# Patient Record
Sex: MALE | Race: WHITE | NOT HISPANIC OR LATINO
[De-identification: names, ages, dates, MRNs, and addresses within clinical notes are randomized per-mention and may not be internally consistent; named-entity substitution may affect disease eponyms.]

---

## 2017-02-16 ENCOUNTER — NON-APPOINTMENT (OUTPATIENT)
Age: 47
End: 2017-02-16

## 2017-02-16 ENCOUNTER — APPOINTMENT (OUTPATIENT)
Dept: ELECTROPHYSIOLOGY | Facility: CLINIC | Age: 47
End: 2017-02-16

## 2017-02-16 VITALS
HEIGHT: 77 IN | WEIGHT: 260 LBS | SYSTOLIC BLOOD PRESSURE: 119 MMHG | OXYGEN SATURATION: 98 % | BODY MASS INDEX: 30.7 KG/M2 | DIASTOLIC BLOOD PRESSURE: 80 MMHG | HEART RATE: 90 BPM

## 2017-02-16 DIAGNOSIS — I49.9 CARDIAC ARRHYTHMIA, UNSPECIFIED: ICD-10-CM

## 2017-02-16 DIAGNOSIS — F15.90 OTHER STIMULANT USE, UNSPECIFIED, UNCOMPLICATED: ICD-10-CM

## 2017-04-17 ENCOUNTER — OTHER (OUTPATIENT)
Age: 47
End: 2017-04-17

## 2017-04-20 ENCOUNTER — APPOINTMENT (OUTPATIENT)
Dept: CV DIAGNOSITCS | Facility: HOSPITAL | Age: 47
End: 2017-04-20

## 2017-04-20 ENCOUNTER — OUTPATIENT (OUTPATIENT)
Dept: OUTPATIENT SERVICES | Facility: HOSPITAL | Age: 47
LOS: 1 days | End: 2017-04-20
Payer: COMMERCIAL

## 2017-04-20 VITALS
WEIGHT: 270.07 LBS | OXYGEN SATURATION: 98 % | RESPIRATION RATE: 16 BRPM | HEART RATE: 75 BPM | HEIGHT: 77 IN | DIASTOLIC BLOOD PRESSURE: 78 MMHG | TEMPERATURE: 98 F | SYSTOLIC BLOOD PRESSURE: 124 MMHG

## 2017-04-20 DIAGNOSIS — I49.9 CARDIAC ARRHYTHMIA, UNSPECIFIED: ICD-10-CM

## 2017-04-20 DIAGNOSIS — Z01.818 ENCOUNTER FOR OTHER PREPROCEDURAL EXAMINATION: ICD-10-CM

## 2017-04-20 LAB
ALBUMIN SERPL ELPH-MCNC: 3.9 G/DL — SIGNIFICANT CHANGE UP (ref 3.3–5)
ALP SERPL-CCNC: 32 U/L — LOW (ref 40–120)
ALT FLD-CCNC: 29 U/L RC — SIGNIFICANT CHANGE UP (ref 10–45)
ANION GAP SERPL CALC-SCNC: 11 MMOL/L — SIGNIFICANT CHANGE UP (ref 5–17)
APTT BLD: 44.7 SEC — HIGH (ref 27.5–37.4)
AST SERPL-CCNC: 28 U/L — SIGNIFICANT CHANGE UP (ref 10–40)
BILIRUB SERPL-MCNC: 0.5 MG/DL — SIGNIFICANT CHANGE UP (ref 0.2–1.2)
BUN SERPL-MCNC: 15 MG/DL — SIGNIFICANT CHANGE UP (ref 7–23)
CALCIUM SERPL-MCNC: 9.5 MG/DL — SIGNIFICANT CHANGE UP (ref 8.4–10.5)
CHLORIDE SERPL-SCNC: 104 MMOL/L — SIGNIFICANT CHANGE UP (ref 96–108)
CO2 SERPL-SCNC: 27 MMOL/L — SIGNIFICANT CHANGE UP (ref 22–31)
CREAT SERPL-MCNC: 1.15 MG/DL — SIGNIFICANT CHANGE UP (ref 0.5–1.3)
GLUCOSE SERPL-MCNC: 104 MG/DL — HIGH (ref 70–99)
HCT VFR BLD CALC: 51.1 % — HIGH (ref 39–50)
HGB BLD-MCNC: 17.4 G/DL — HIGH (ref 13–17)
INR BLD: 2.37 RATIO — HIGH (ref 0.88–1.16)
MCHC RBC-ENTMCNC: 32.7 PG — SIGNIFICANT CHANGE UP (ref 27–34)
MCHC RBC-ENTMCNC: 34.1 GM/DL — SIGNIFICANT CHANGE UP (ref 32–36)
MCV RBC AUTO: 96.1 FL — SIGNIFICANT CHANGE UP (ref 80–100)
PLATELET # BLD AUTO: 175 K/UL — SIGNIFICANT CHANGE UP (ref 150–400)
POTASSIUM SERPL-MCNC: 5.4 MMOL/L — HIGH (ref 3.5–5.3)
POTASSIUM SERPL-SCNC: 5.4 MMOL/L — HIGH (ref 3.5–5.3)
PROT SERPL-MCNC: 6.5 G/DL — SIGNIFICANT CHANGE UP (ref 6–8.3)
PROTHROM AB SERPL-ACNC: 26.3 SEC — HIGH (ref 9.8–12.7)
RBC # BLD: 5.32 M/UL — SIGNIFICANT CHANGE UP (ref 4.2–5.8)
RBC # FLD: 13.5 % — SIGNIFICANT CHANGE UP (ref 10.3–14.5)
SODIUM SERPL-SCNC: 142 MMOL/L — SIGNIFICANT CHANGE UP (ref 135–145)
WBC # BLD: 7.5 K/UL — SIGNIFICANT CHANGE UP (ref 3.8–10.5)
WBC # FLD AUTO: 7.5 K/UL — SIGNIFICANT CHANGE UP (ref 3.8–10.5)

## 2017-04-20 PROCEDURE — 85610 PROTHROMBIN TIME: CPT

## 2017-04-20 PROCEDURE — 93312 ECHO TRANSESOPHAGEAL: CPT

## 2017-04-20 PROCEDURE — 85730 THROMBOPLASTIN TIME PARTIAL: CPT

## 2017-04-20 PROCEDURE — 85027 COMPLETE CBC AUTOMATED: CPT

## 2017-04-20 PROCEDURE — 93306 TTE W/DOPPLER COMPLETE: CPT | Mod: 26

## 2017-04-20 PROCEDURE — 93010 ELECTROCARDIOGRAM REPORT: CPT

## 2017-04-20 PROCEDURE — 93306 TTE W/DOPPLER COMPLETE: CPT

## 2017-04-20 PROCEDURE — 93312 ECHO TRANSESOPHAGEAL: CPT | Mod: 26

## 2017-04-20 PROCEDURE — 80053 COMPREHEN METABOLIC PANEL: CPT

## 2017-04-20 RX ORDER — METOPROLOL TARTRATE 50 MG
1 TABLET ORAL
Qty: 0 | Refills: 0 | COMMUNITY

## 2017-04-20 RX ORDER — AMIODARONE HYDROCHLORIDE 400 MG/1
2 TABLET ORAL
Qty: 0 | Refills: 0 | COMMUNITY

## 2017-04-20 NOTE — H&P CARDIOLOGY - PSH
Gastric bypass status for obesity    H/O nasal septoplasty Gastric bypass status for obesity    H/O nasal septoplasty    S/P AVR

## 2017-04-20 NOTE — H&P CARDIOLOGY - HISTORY OF PRESENT ILLNESS
This is a 46 yr old male, former smoker, with PMH of HTN, Aortic Aneurysm, Atrial flutter dx in 2013 ( on Coumadin last dose 4/20). Presents to Md Burger with occasional dizziness, and palpitations worse in the past 2 months.  Denies chest pain, syncope, dyspnea, orthopnea, N&V, HA. Presents here today for PST for EP study w/ possible atrial flutter ablation tomorrow 4/21. Currently asymptomatic. This is a 46 yr old male, former smoker, with PMH of HTN, Aortic Aneurysm, Atrial flutter dx in 2013 and AVR ( on Coumadin last dose 4/20). Presents to Md Burger with occasional dizziness, and palpitations worse in the past 2 months.  Denies chest pain, syncope, dyspnea, orthopnea, N&V, HA. Presents here today for PST for EP study w/ possible atrial flutter ablation tomorrow 4/21. Currently asymptomatic.

## 2017-04-21 ENCOUNTER — OUTPATIENT (OUTPATIENT)
Dept: INPATIENT UNIT | Facility: HOSPITAL | Age: 47
LOS: 1 days | End: 2017-04-21
Payer: COMMERCIAL

## 2017-04-21 ENCOUNTER — TRANSCRIPTION ENCOUNTER (OUTPATIENT)
Age: 47
End: 2017-04-21

## 2017-04-21 VITALS
HEART RATE: 80 BPM | WEIGHT: 270.07 LBS | RESPIRATION RATE: 16 BRPM | TEMPERATURE: 99 F | SYSTOLIC BLOOD PRESSURE: 114 MMHG | DIASTOLIC BLOOD PRESSURE: 76 MMHG | OXYGEN SATURATION: 95 % | HEIGHT: 77 IN

## 2017-04-21 DIAGNOSIS — I49 OTHER CARDIAC ARRHYTHMIAS: ICD-10-CM

## 2017-04-21 DIAGNOSIS — I49.9 CARDIAC ARRHYTHMIA, UNSPECIFIED: ICD-10-CM

## 2017-04-21 DIAGNOSIS — Z95.2 PRESENCE OF PROSTHETIC HEART VALVE: Chronic | ICD-10-CM

## 2017-04-21 LAB
ANION GAP SERPL CALC-SCNC: 13 MMOL/L — SIGNIFICANT CHANGE UP (ref 5–17)
APTT BLD: 49.3 SEC — HIGH (ref 27.5–37.4)
BUN SERPL-MCNC: 14 MG/DL — SIGNIFICANT CHANGE UP (ref 7–23)
CALCIUM SERPL-MCNC: 9.9 MG/DL — SIGNIFICANT CHANGE UP (ref 8.4–10.5)
CHLORIDE SERPL-SCNC: 100 MMOL/L — SIGNIFICANT CHANGE UP (ref 96–108)
CO2 SERPL-SCNC: 27 MMOL/L — SIGNIFICANT CHANGE UP (ref 22–31)
CREAT SERPL-MCNC: 1.12 MG/DL — SIGNIFICANT CHANGE UP (ref 0.5–1.3)
GLUCOSE SERPL-MCNC: 117 MG/DL — HIGH (ref 70–99)
INR BLD: 2.26 RATIO — HIGH (ref 0.88–1.16)
POTASSIUM SERPL-MCNC: 4.4 MMOL/L — SIGNIFICANT CHANGE UP (ref 3.5–5.3)
POTASSIUM SERPL-SCNC: 4.4 MMOL/L — SIGNIFICANT CHANGE UP (ref 3.5–5.3)
PROTHROM AB SERPL-ACNC: 25 SEC — HIGH (ref 9.8–12.7)
SODIUM SERPL-SCNC: 140 MMOL/L — SIGNIFICANT CHANGE UP (ref 135–145)

## 2017-04-21 PROCEDURE — 93010 ELECTROCARDIOGRAM REPORT: CPT

## 2017-04-21 PROCEDURE — 93653 COMPRE EP EVAL TX SVT: CPT

## 2017-04-21 PROCEDURE — 93613 INTRACARDIAC EPHYS 3D MAPG: CPT

## 2017-04-21 RX ORDER — ACETAMINOPHEN 500 MG
1 TABLET ORAL
Qty: 30 | Refills: 0 | OUTPATIENT
Start: 2017-04-21 | End: 2017-04-26

## 2017-04-21 RX ORDER — ACETAMINOPHEN 500 MG
325 TABLET ORAL EVERY 4 HOURS
Qty: 0 | Refills: 0 | Status: DISCONTINUED | OUTPATIENT
Start: 2017-04-21 | End: 2017-04-22

## 2017-04-21 RX ORDER — WARFARIN SODIUM 2.5 MG/1
1 TABLET ORAL
Qty: 0 | Refills: 0 | COMMUNITY

## 2017-04-21 RX ORDER — WARFARIN SODIUM 2.5 MG/1
10 TABLET ORAL
Qty: 0 | Refills: 0 | Status: COMPLETED | OUTPATIENT
Start: 2017-04-22 | End: 2017-04-22

## 2017-04-21 RX ORDER — METOPROLOL TARTRATE 50 MG
50 TABLET ORAL
Qty: 0 | Refills: 0 | Status: DISCONTINUED | OUTPATIENT
Start: 2017-04-21 | End: 2017-04-22

## 2017-04-21 RX ORDER — ZALEPLON 10 MG
5 CAPSULE ORAL ONCE
Qty: 0 | Refills: 0 | Status: DISCONTINUED | OUTPATIENT
Start: 2017-04-21 | End: 2017-04-21

## 2017-04-21 RX ORDER — AMIODARONE HYDROCHLORIDE 400 MG/1
200 TABLET ORAL DAILY
Qty: 0 | Refills: 0 | Status: DISCONTINUED | OUTPATIENT
Start: 2017-04-21 | End: 2017-04-22

## 2017-04-21 RX ORDER — ACETAMINOPHEN 500 MG
1000 TABLET ORAL ONCE
Qty: 0 | Refills: 0 | Status: COMPLETED | OUTPATIENT
Start: 2017-04-21 | End: 2017-04-21

## 2017-04-21 RX ADMIN — Medication 50 MILLIGRAM(S): at 17:45

## 2017-04-21 RX ADMIN — Medication 5 MILLIGRAM(S): at 22:17

## 2017-04-21 RX ADMIN — Medication 400 MILLIGRAM(S): at 13:57

## 2017-04-21 NOTE — DISCHARGE NOTE ADULT - CARE PROVIDERS DIRECT ADDRESSES
,[email or web address]

## 2017-04-21 NOTE — DISCHARGE NOTE ADULT - MEDICATION SUMMARY - MEDICATIONS TO TAKE
I will START or STAY ON the medications listed below when I get home from the hospital:    acetaminophen 325 mg oral tablet  -- 1 tab(s) by mouth every 4 hours, As needed, Moderate Pain (4 - 6)  -- Indication: For mild pain    Endocet 5/325 oral tablet  -- 1 tab(s) by mouth 1 to 3 times a day, As Needed, Severe Pain (7 - 10) MDD:3 tabs  -- Indication: For Severe pain    amiodarone 200 mg oral tablet  -- 1 tab(s) by mouth once a day  -- Indication: For Atrial flutter    Coumadin 10 mg oral tablet  -- 1 tab(s) by mouth once a day    -- Indication: For Atrial flutter, AVR    metoprolol tartrate 50 mg oral tablet  -- 1 tab(s) by mouth 2 times a day  -- Indication: For Atrial flutter

## 2017-04-21 NOTE — DISCHARGE NOTE ADULT - PLAN OF CARE
Your heart rate and rhythm will be controlled. Continue with your cardiologist and primary care MD. Continue your current medications. Call your physician for palpitations, feelings of rapid heart beat, lightheadedness, or dizziness. If you are on warfarin (Coumadin), have your blood work drawn (prescription given) on 4/24 or 4/25. Ask your nurse for written material about Coumadin and to provide patient education before discharge. Your blood pressure will be controlled. Continue with your blood pressure medications; eat a heart healthy diet with low salt diet; exercise regularly (consult with your physician or cardiologist first); maintain a heart healthy weight; if you smoke - quit (A resource to help you stop smoking is the [facility] for Tobacco Control – phone number 932-087-9308.); include healthy ways to manage stress. Continue to follow with your primary care physician or cardiologist. no SOB or discomfort follow up with your cardiologist as scheduled, take Coumadin. Continue with your cardiologist and primary care MD. Continue your current medications. Call your physician for palpitations, feelings of rapid heart beat, lightheadedness, or dizziness. If you are on warfarin (Coumadin), have your blood work drawn (prescription given) on 4/24 or 4/25. Ask your nurse for written material about Coumadin and to provide patient education before discharge.  No heavy lifting, strenuous activity, bending, straining, or unnecessary stair climbing for 2 weeks. No driving for 2 days. You may shower 24 hours following the procedure but avoid baths/swimming for 1 week. Check your groin site for bleeding and/or swelling daily following procedure and call your doctor immediately if it occurs or if you experience increased pain at the site. Follow up with your cardiologist in 1-2 weeks. Continue with your blood pressure medications; eat a heart healthy diet with low salt diet; exercise regularly (consult with your physician or cardiologist first); maintain a heart healthy weight; if you smoke - quit (A resource to help you stop smoking is the [facility] for Tobacco Control – phone number 435-101-8402.); include healthy ways to manage stress. Continue to follow with your primary care physician or cardiologist Dr. Saini on Tuesday.

## 2017-04-21 NOTE — DISCHARGE NOTE ADULT - NS AS ACTIVITY OBS
No Heavy lifting/straining/Walking-Outdoors allowed/Stairs allowed/Walking-Indoors allowed Walking-Outdoors allowed/Walking-Indoors allowed/Stairs allowed/No Heavy lifting/straining/Showering allowed

## 2017-04-21 NOTE — DISCHARGE NOTE ADULT - HOSPITAL COURSE
This is a 46 yr old male, former smoker, with PMH of HTN, Aortic Aneurysm, Atrial flutter dx in 2013 and AVR ( on Coumadin last dose 4/20). Presents to Md Burger with occasional dizziness, and palpitations worse in the past 2 months.  Denies chest pain, syncope, dyspnea, orthopnea, N&V, HA. Presents here today for PST for EP study w/ possible atrial flutter ablation tomorrow 4/21. S/p A-flutter ablation via right groin, site benign, VSS. This is a 46 yr old male, former smoker, with PMH of HTN, Aortic Aneurysm, Atrial flutter dx in 2013 and AVR ( on Coumadin last dose 4/20). Presents to Md Burger with occasional dizziness, and palpitations worse in the past 2 months.  Denies chest pain, syncope, dyspnea, orthopnea, N&V, HA. Presents here today for PST for EP study w/ possible atrial flutter ablation tomorrow 4/21. S/p A-flutter ablation via right groin, site benign, VSS. Pt tolerated procedure well with no post complications and hospitalization remained uneventful. No c/o chest pain or SOB. Discharge teaching provided to Pt/caretaker and verbalized understanding the instruction. Pt is stable for discharge home as per attending.

## 2017-04-21 NOTE — DISCHARGE NOTE ADULT - ADDITIONAL INSTRUCTIONS
No heavy lifting, strenuous activity, bending, straining, or unnecessary stair climbing for 2 weeks. No driving for 2 days. You may shower 24 hours following the procedure but avoid baths/swimming for 1 week. Check your groin site for bleeding and/or swelling daily following procedure and call your doctor immediately if it occurs or if you experience increased pain at the site. Follow up with your cardiologist in 1-2 weeks. You may call [facility] if you have any questions/concerns regarding your procedure [phone].

## 2017-04-21 NOTE — DISCHARGE NOTE ADULT - PATIENT PORTAL LINK FT
“You can access the FollowHealth Patient Portal, offered by [facility], by registering with the following website: [email or web address]”

## 2017-04-21 NOTE — DISCHARGE NOTE ADULT - CARE PROVIDER_API CALL
Jacob Burger), Cardiac Electrophysiology; Cardiology  [address] [place] [address]  Phone: [phone]  Fax: [phone] Jacob Burger), Cardiac Electrophysiology; Cardiology  [address] [place] [address]  Phone: [phone]  Fax: [phone]    Delvis Saini), Cardiovascular Disease; Internal Medicine  [address] [place] [address]  Phone: [phone]  Fax: [phone]

## 2017-04-21 NOTE — DISCHARGE NOTE ADULT - CARE PLAN
Principal Discharge DX:	Atrial flutter  Goal:	Your heart rate and rhythm will be controlled.  Instructions for follow-up, activity and diet:	Continue with your cardiologist and primary care MD. Continue your current medications. Call your physician for palpitations, feelings of rapid heart beat, lightheadedness, or dizziness. If you are on warfarin (Coumadin), have your blood work drawn (prescription given) on 4/24 or 4/25. Ask your nurse for written material about Coumadin and to provide patient education before discharge.  Secondary Diagnosis:	HTN (hypertension)  Goal:	Your blood pressure will be controlled.  Instructions for follow-up, activity and diet:	Continue with your blood pressure medications; eat a heart healthy diet with low salt diet; exercise regularly (consult with your physician or cardiologist first); maintain a heart healthy weight; if you smoke - quit (A resource to help you stop smoking is the [facility] for Tobacco Control – phone number 347-120-4574.); include healthy ways to manage stress. Continue to follow with your primary care physician or cardiologist.  Secondary Diagnosis:	S/P AVR  Goal:	no SOB or discomfort  Instructions for follow-up, activity and diet:	follow up with your cardiologist as scheduled, take Coumadin. Principal Discharge DX:	Atrial flutter  Goal:	Your heart rate and rhythm will be controlled.  Instructions for follow-up, activity and diet:	Continue with your cardiologist and primary care MD. Continue your current medications. Call your physician for palpitations, feelings of rapid heart beat, lightheadedness, or dizziness. If you are on warfarin (Coumadin), have your blood work drawn (prescription given) on 4/24 or 4/25. Ask your nurse for written material about Coumadin and to provide patient education before discharge.  No heavy lifting, strenuous activity, bending, straining, or unnecessary stair climbing for 2 weeks. No driving for 2 days. You may shower 24 hours following the procedure but avoid baths/swimming for 1 week. Check your groin site for bleeding and/or swelling daily following procedure and call your doctor immediately if it occurs or if you experience increased pain at the site. Follow up with your cardiologist in 1-2 weeks.  Secondary Diagnosis:	HTN (hypertension)  Goal:	Your blood pressure will be controlled.  Instructions for follow-up, activity and diet:	Continue with your blood pressure medications; eat a heart healthy diet with low salt diet; exercise regularly (consult with your physician or cardiologist first); maintain a heart healthy weight; if you smoke - quit (A resource to help you stop smoking is the [facility] for Tobacco Control – phone number [phone].); include healthy ways to manage stress. Continue to follow with your primary care physician or cardiologist.  Secondary Diagnosis:	S/P AVR  Goal:	no SOB or discomfort  Instructions for follow-up, activity and diet:	follow up with your cardiologist as scheduled, take Coumadin. Principal Discharge DX:	Atrial flutter  Goal:	Your heart rate and rhythm will be controlled.  Instructions for follow-up, activity and diet:	Continue with your cardiologist and primary care MD. Continue your current medications. Call your physician for palpitations, feelings of rapid heart beat, lightheadedness, or dizziness. If you are on warfarin (Coumadin), have your blood work drawn (prescription given) on 4/24 or 4/25. Ask your nurse for written material about Coumadin and to provide patient education before discharge.  No heavy lifting, strenuous activity, bending, straining, or unnecessary stair climbing for 2 weeks. No driving for 2 days. You may shower 24 hours following the procedure but avoid baths/swimming for 1 week. Check your groin site for bleeding and/or swelling daily following procedure and call your doctor immediately if it occurs or if you experience increased pain at the site. Follow up with your cardiologist in 1-2 weeks.  Secondary Diagnosis:	HTN (hypertension)  Goal:	Your blood pressure will be controlled.  Instructions for follow-up, activity and diet:	Continue with your blood pressure medications; eat a heart healthy diet with low salt diet; exercise regularly (consult with your physician or cardiologist first); maintain a heart healthy weight; if you smoke - quit (A resource to help you stop smoking is the [facility] Tobacco [facility] – phone number [phone].); include healthy ways to manage stress. Continue to follow with your primary care physician or cardiologist Dr. Saini on Tuesday.  Secondary Diagnosis:	S/P AVR  Goal:	no SOB or discomfort  Instructions for follow-up, activity and diet:	follow up with your cardiologist as scheduled, take Coumadin. Principal Discharge DX:	Atrial flutter  Goal:	Your heart rate and rhythm will be controlled.  Instructions for follow-up, activity and diet:	Continue with your cardiologist and primary care MD. Continue your current medications. Call your physician for palpitations, feelings of rapid heart beat, lightheadedness, or dizziness. If you are on warfarin (Coumadin), have your blood work drawn (prescription given) on 4/24 or 4/25. Ask your nurse for written material about Coumadin and to provide patient education before discharge.  No heavy lifting, strenuous activity, bending, straining, or unnecessary stair climbing for 2 weeks. No driving for 2 days. You may shower 24 hours following the procedure but avoid baths/swimming for 1 week. Check your groin site for bleeding and/or swelling daily following procedure and call your doctor immediately if it occurs or if you experience increased pain at the site. Follow up with your cardiologist in 1-2 weeks.  Secondary Diagnosis:	HTN (hypertension)  Goal:	Your blood pressure will be controlled.  Instructions for follow-up, activity and diet:	Continue with your blood pressure medications; eat a heart healthy diet with low salt diet; exercise regularly (consult with your physician or cardiologist first); maintain a heart healthy weight; if you smoke - quit (A resource to help you stop smoking is the [facility] for Tobacco Control – phone number [phone].); include healthy ways to manage stress. Continue to follow with your primary care physician or cardiologist Dr. Saini on Tuesday.  Secondary Diagnosis:	S/P AVR  Goal:	no SOB or discomfort  Instructions for follow-up, activity and diet:	follow up with your cardiologist as scheduled, take Coumadin.

## 2017-04-22 VITALS
TEMPERATURE: 98 F | DIASTOLIC BLOOD PRESSURE: 67 MMHG | SYSTOLIC BLOOD PRESSURE: 105 MMHG | OXYGEN SATURATION: 96 % | HEART RATE: 62 BPM | RESPIRATION RATE: 18 BRPM

## 2017-04-22 LAB
ANION GAP SERPL CALC-SCNC: 12 MMOL/L — SIGNIFICANT CHANGE UP (ref 5–17)
BUN SERPL-MCNC: 15 MG/DL — SIGNIFICANT CHANGE UP (ref 7–23)
CALCIUM SERPL-MCNC: 8.8 MG/DL — SIGNIFICANT CHANGE UP (ref 8.4–10.5)
CHLORIDE SERPL-SCNC: 103 MMOL/L — SIGNIFICANT CHANGE UP (ref 96–108)
CO2 SERPL-SCNC: 24 MMOL/L — SIGNIFICANT CHANGE UP (ref 22–31)
CREAT SERPL-MCNC: 0.98 MG/DL — SIGNIFICANT CHANGE UP (ref 0.5–1.3)
GLUCOSE SERPL-MCNC: 111 MG/DL — HIGH (ref 70–99)
HCT VFR BLD CALC: 43.7 % — SIGNIFICANT CHANGE UP (ref 39–50)
HGB BLD-MCNC: 15.2 G/DL — SIGNIFICANT CHANGE UP (ref 13–17)
INR BLD: 3.16 RATIO — HIGH (ref 0.88–1.16)
MCHC RBC-ENTMCNC: 33 PG — SIGNIFICANT CHANGE UP (ref 27–34)
MCHC RBC-ENTMCNC: 34.9 GM/DL — SIGNIFICANT CHANGE UP (ref 32–36)
MCV RBC AUTO: 94.5 FL — SIGNIFICANT CHANGE UP (ref 80–100)
PLATELET # BLD AUTO: 165 K/UL — SIGNIFICANT CHANGE UP (ref 150–400)
POTASSIUM SERPL-MCNC: 4.2 MMOL/L — SIGNIFICANT CHANGE UP (ref 3.5–5.3)
POTASSIUM SERPL-SCNC: 4.2 MMOL/L — SIGNIFICANT CHANGE UP (ref 3.5–5.3)
PROTHROM AB SERPL-ACNC: 34.9 SEC — HIGH (ref 9.8–12.7)
RBC # BLD: 4.63 M/UL — SIGNIFICANT CHANGE UP (ref 4.2–5.8)
RBC # FLD: 13.4 % — SIGNIFICANT CHANGE UP (ref 10.3–14.5)
SODIUM SERPL-SCNC: 139 MMOL/L — SIGNIFICANT CHANGE UP (ref 135–145)
WBC # BLD: 15.6 K/UL — HIGH (ref 3.8–10.5)
WBC # FLD AUTO: 15.6 K/UL — HIGH (ref 3.8–10.5)

## 2017-04-22 PROCEDURE — C1894: CPT

## 2017-04-22 PROCEDURE — 93005 ELECTROCARDIOGRAM TRACING: CPT

## 2017-04-22 PROCEDURE — C1766: CPT

## 2017-04-22 PROCEDURE — 85730 THROMBOPLASTIN TIME PARTIAL: CPT

## 2017-04-22 PROCEDURE — 93653 COMPRE EP EVAL TX SVT: CPT

## 2017-04-22 PROCEDURE — 85610 PROTHROMBIN TIME: CPT

## 2017-04-22 PROCEDURE — C1732: CPT

## 2017-04-22 PROCEDURE — 93312 ECHO TRANSESOPHAGEAL: CPT

## 2017-04-22 PROCEDURE — 80048 BASIC METABOLIC PNL TOTAL CA: CPT

## 2017-04-22 PROCEDURE — 93306 TTE W/DOPPLER COMPLETE: CPT

## 2017-04-22 PROCEDURE — 80053 COMPREHEN METABOLIC PANEL: CPT

## 2017-04-22 PROCEDURE — 93613 INTRACARDIAC EPHYS 3D MAPG: CPT

## 2017-04-22 PROCEDURE — C1731: CPT

## 2017-04-22 PROCEDURE — C1730: CPT

## 2017-04-22 PROCEDURE — 85027 COMPLETE CBC AUTOMATED: CPT

## 2017-04-22 PROCEDURE — 93010 ELECTROCARDIOGRAM REPORT: CPT

## 2017-04-22 RX ADMIN — WARFARIN SODIUM 10 MILLIGRAM(S): 2.5 TABLET ORAL at 05:43

## 2017-04-22 RX ADMIN — Medication 50 MILLIGRAM(S): at 05:43

## 2017-04-22 RX ADMIN — AMIODARONE HYDROCHLORIDE 200 MILLIGRAM(S): 400 TABLET ORAL at 05:43

## 2017-05-25 ENCOUNTER — NON-APPOINTMENT (OUTPATIENT)
Age: 47
End: 2017-05-25

## 2017-05-25 ENCOUNTER — APPOINTMENT (OUTPATIENT)
Dept: ELECTROPHYSIOLOGY | Facility: CLINIC | Age: 47
End: 2017-05-25

## 2017-05-25 VITALS — SYSTOLIC BLOOD PRESSURE: 151 MMHG | OXYGEN SATURATION: 96 % | DIASTOLIC BLOOD PRESSURE: 90 MMHG | HEART RATE: 66 BPM

## 2017-05-25 DIAGNOSIS — I48.3 TYPICAL ATRIAL FLUTTER: ICD-10-CM

## 2017-05-25 DIAGNOSIS — I47.2 VENTRICULAR TACHYCARDIA: ICD-10-CM

## 2017-05-25 DIAGNOSIS — I48.91 UNSPECIFIED ATRIAL FIBRILLATION: ICD-10-CM

## 2019-01-04 ENCOUNTER — EMERGENCY (EMERGENCY)
Facility: HOSPITAL | Age: 49
LOS: 1 days | Discharge: HOME | End: 2019-01-04
Attending: EMERGENCY MEDICINE | Admitting: EMERGENCY MEDICINE
Payer: COMMERCIAL

## 2019-01-04 VITALS
SYSTOLIC BLOOD PRESSURE: 136 MMHG | TEMPERATURE: 98 F | DIASTOLIC BLOOD PRESSURE: 70 MMHG | RESPIRATION RATE: 14 BRPM | HEART RATE: 78 BPM | OXYGEN SATURATION: 99 %

## 2019-01-04 VITALS
OXYGEN SATURATION: 97 % | TEMPERATURE: 99 F | WEIGHT: 265 LBS | SYSTOLIC BLOOD PRESSURE: 145 MMHG | HEART RATE: 71 BPM | HEIGHT: 77 IN | DIASTOLIC BLOOD PRESSURE: 87 MMHG | RESPIRATION RATE: 16 BRPM

## 2019-01-04 DIAGNOSIS — Z95.2 PRESENCE OF PROSTHETIC HEART VALVE: Chronic | ICD-10-CM

## 2019-01-04 PROBLEM — I48.92 UNSPECIFIED ATRIAL FLUTTER: Chronic | Status: ACTIVE | Noted: 2017-04-20

## 2019-01-04 LAB
ALBUMIN SERPL ELPH-MCNC: 3.4 G/DL — SIGNIFICANT CHANGE UP (ref 3.3–5)
ALP SERPL-CCNC: 40 U/L — SIGNIFICANT CHANGE UP (ref 40–120)
ALT FLD-CCNC: 19 U/L — SIGNIFICANT CHANGE UP (ref 12–78)
ANION GAP SERPL CALC-SCNC: 5 MMOL/L — SIGNIFICANT CHANGE UP (ref 5–17)
AST SERPL-CCNC: 21 U/L — SIGNIFICANT CHANGE UP (ref 15–37)
BASOPHILS # BLD AUTO: 0.08 K/UL — SIGNIFICANT CHANGE UP (ref 0–0.2)
BASOPHILS NFR BLD AUTO: 0.6 % — SIGNIFICANT CHANGE UP (ref 0–2)
BILIRUB SERPL-MCNC: 0.8 MG/DL — SIGNIFICANT CHANGE UP (ref 0.2–1.2)
BUN SERPL-MCNC: 11 MG/DL — SIGNIFICANT CHANGE UP (ref 7–23)
CALCIUM SERPL-MCNC: 9 MG/DL — SIGNIFICANT CHANGE UP (ref 8.5–10.1)
CHLORIDE SERPL-SCNC: 105 MMOL/L — SIGNIFICANT CHANGE UP (ref 96–108)
CO2 SERPL-SCNC: 28 MMOL/L — SIGNIFICANT CHANGE UP (ref 22–31)
CREAT SERPL-MCNC: 0.96 MG/DL — SIGNIFICANT CHANGE UP (ref 0.5–1.3)
EOSINOPHIL # BLD AUTO: 0.11 K/UL — SIGNIFICANT CHANGE UP (ref 0–0.5)
EOSINOPHIL NFR BLD AUTO: 0.9 % — SIGNIFICANT CHANGE UP (ref 0–6)
GLUCOSE SERPL-MCNC: 103 MG/DL — HIGH (ref 70–99)
HCT VFR BLD CALC: 42.3 % — SIGNIFICANT CHANGE UP (ref 39–50)
HGB BLD-MCNC: 14.6 G/DL — SIGNIFICANT CHANGE UP (ref 13–17)
IMM GRANULOCYTES NFR BLD AUTO: 0.5 % — SIGNIFICANT CHANGE UP (ref 0–1.5)
LYMPHOCYTES # BLD AUTO: 1.55 K/UL — SIGNIFICANT CHANGE UP (ref 1–3.3)
LYMPHOCYTES # BLD AUTO: 12.5 % — LOW (ref 13–44)
MCHC RBC-ENTMCNC: 31.3 PG — SIGNIFICANT CHANGE UP (ref 27–34)
MCHC RBC-ENTMCNC: 34.5 GM/DL — SIGNIFICANT CHANGE UP (ref 32–36)
MCV RBC AUTO: 90.8 FL — SIGNIFICANT CHANGE UP (ref 80–100)
MONOCYTES # BLD AUTO: 0.95 K/UL — HIGH (ref 0–0.9)
MONOCYTES NFR BLD AUTO: 7.6 % — SIGNIFICANT CHANGE UP (ref 2–14)
NEUTROPHILS # BLD AUTO: 9.68 K/UL — HIGH (ref 1.8–7.4)
NEUTROPHILS NFR BLD AUTO: 77.9 % — HIGH (ref 43–77)
PLATELET # BLD AUTO: 175 K/UL — SIGNIFICANT CHANGE UP (ref 150–400)
POTASSIUM SERPL-MCNC: 4.6 MMOL/L — SIGNIFICANT CHANGE UP (ref 3.5–5.3)
POTASSIUM SERPL-SCNC: 4.6 MMOL/L — SIGNIFICANT CHANGE UP (ref 3.5–5.3)
PROT SERPL-MCNC: 6.3 G/DL — SIGNIFICANT CHANGE UP (ref 6–8.3)
RBC # BLD: 4.66 M/UL — SIGNIFICANT CHANGE UP (ref 4.2–5.8)
RBC # FLD: 13.2 % — SIGNIFICANT CHANGE UP (ref 10.3–14.5)
SODIUM SERPL-SCNC: 138 MMOL/L — SIGNIFICANT CHANGE UP (ref 135–145)
WBC # BLD: 12.43 K/UL — HIGH (ref 3.8–10.5)
WBC # FLD AUTO: 12.43 K/UL — HIGH (ref 3.8–10.5)

## 2019-01-04 PROCEDURE — 99284 EMERGENCY DEPT VISIT MOD MDM: CPT

## 2019-01-04 PROCEDURE — 85027 COMPLETE CBC AUTOMATED: CPT

## 2019-01-04 PROCEDURE — 86850 RBC ANTIBODY SCREEN: CPT

## 2019-01-04 PROCEDURE — 96375 TX/PRO/DX INJ NEW DRUG ADDON: CPT

## 2019-01-04 PROCEDURE — 99284 EMERGENCY DEPT VISIT MOD MDM: CPT | Mod: 25

## 2019-01-04 PROCEDURE — 96374 THER/PROPH/DIAG INJ IV PUSH: CPT

## 2019-01-04 PROCEDURE — 86900 BLOOD TYPING SEROLOGIC ABO: CPT

## 2019-01-04 PROCEDURE — 36415 COLL VENOUS BLD VENIPUNCTURE: CPT

## 2019-01-04 PROCEDURE — 80053 COMPREHEN METABOLIC PANEL: CPT

## 2019-01-04 PROCEDURE — 86901 BLOOD TYPING SEROLOGIC RH(D): CPT

## 2019-01-04 RX ORDER — CYCLOBENZAPRINE HYDROCHLORIDE 10 MG/1
1 TABLET, FILM COATED ORAL
Qty: 15 | Refills: 0 | OUTPATIENT
Start: 2019-01-04 | End: 2019-01-08

## 2019-01-04 RX ORDER — ONDANSETRON 8 MG/1
4 TABLET, FILM COATED ORAL ONCE
Qty: 0 | Refills: 0 | Status: COMPLETED | OUTPATIENT
Start: 2019-01-04 | End: 2019-01-04

## 2019-01-04 RX ORDER — MORPHINE SULFATE 50 MG/1
4 CAPSULE, EXTENDED RELEASE ORAL ONCE
Qty: 0 | Refills: 0 | Status: DISCONTINUED | OUTPATIENT
Start: 2019-01-04 | End: 2019-01-04

## 2019-01-04 RX ADMIN — MORPHINE SULFATE 4 MILLIGRAM(S): 50 CAPSULE, EXTENDED RELEASE ORAL at 13:45

## 2019-01-04 RX ADMIN — MORPHINE SULFATE 4 MILLIGRAM(S): 50 CAPSULE, EXTENDED RELEASE ORAL at 12:09

## 2019-01-04 RX ADMIN — ONDANSETRON 4 MILLIGRAM(S): 8 TABLET, FILM COATED ORAL at 12:12

## 2019-01-04 NOTE — ED PROVIDER NOTE - PROGRESS NOTE DETAILS
ortho resident and pts orthopedic dr nolan osuna spoke with ortho resident who will come see pt pain improved after medication, pt ambulatory in ED. pt educated about compartment syndrome and advised return to ED immediately if condition worsens or any concerns, pt agrees and understands spoke with ortho resident who saw pt, advised pt may be discharged as per dr oseguera and pt to f/u out pt ortho either with dr oseguera or dr francisco. also advised rx cyclobenzaprine q 8 hrs prn pain.

## 2019-01-04 NOTE — ED PROVIDER NOTE - OBJECTIVE STATEMENT
49 y/o male presents to ED c/o right thigh pain x 2 days. States he was doing a lot of physical activity on tuesday however denies recent fall or trauma. States he saw his orthopedic yesterday who diagnosed with a quadriceps strain. States he had an MRI done this am and was told to go to the ER to r/o compartment syndrome. Rates pain 10/10, no radiation of pain, gradual onset, worse with movement. Denies any other complaints. States he otherwise feels good. Denies n/v, f/c, chest pain, sob, numbness, tingling, recent traveling.

## 2019-01-04 NOTE — ED PROVIDER NOTE - ATTENDING CONTRIBUTION TO CARE
47 yo white male on Coumadin with few days of swelling w/o identifiable trauma to right anterior thigh. No paresthesia, weakness, discoloration, pain to RLE. Had MRI today of thigh. Showed hematoma. Exam revealed white male in NAD with slight tenderness anterior right thigh but compartments soft. Skin color normal, pulses normal, strength normal to entire RLE distally and proximally. I agree with plan and management outlined by PA.

## 2019-01-04 NOTE — ED PROVIDER NOTE - PHYSICAL EXAMINATION
No calf tenderness BL. No calf tenderness BL.  lower extremities- No bony tenderness BL except where noted. FROM hips, knees, ankles. NVI, good distal pulses x 4 extremities, capillary refill <2 sec x 4 extremities, sensation intact throughout, 5/5 motor x 4 extremities. +swelling to right thigh, no redness, no warmth, no streaking, no local signs of infection, no bruising.

## 2019-01-04 NOTE — ED ADULT NURSE NOTE - OBJECTIVE STATEMENT
received pt in bed #t2 Pt A&O c/o right thigh pan since Wednesday went to [facility] dx muscle spasm seen by ortho on thursday dx strain/torn quad had MRI this AM "there is bleeding in my thigh" received pt in bed #t2 Pt A&O c/o right thigh pan since Wednesday went to [facility] on Wednesday dx muscle spasm seen by ortho on Thursday dx strain/torn quad had MRI this AM "there is bleeding in my thigh" Pt states right thigh painful & slightly swollen

## 2019-01-04 NOTE — ED PROVIDER NOTE - MEDICAL DECISION MAKING DETAILS
labs, ortho consult to r/o compartment syndrome, pain management, reeval  Please follow up with your Primary MD in 24-48 hr. Please follow up with your orthopedic Dr Rincon within 3 days. Cyclobenzaprine every 8 hours as needed for muscle spasm, do not drive or drink alcohol while taking this medication. OTC tylenol every 6 hours as needed for pain. Rest. Return to ED immediately if condition worsens or any concerns.  Seek immediate medical care for any new/worsening signs or symptoms.

## 2019-01-04 NOTE — PROGRESS NOTE ADULT - SUBJECTIVE AND OBJECTIVE BOX
Orthopaedic Surgery Consult Note    Pt is a 48y Male presenting with R thigh pain s/p lifting boxes on Weds. Pt is a community ambulatory at baseline. Pt has been able to bear weight on affected extremity since time of injury and has been ambulating with pain. Pain has been worsening since time of injury and is currently 10/10. Pt denies numbness, tingling, or paresthesias in affected limb. Pt denies trauma. Pt was seen by Dr. Rincon (Orthopaedic Surgeon) who diagnosed him with a Quad tear. He went for outpatient MRI this morning. Pt takes Warfarin for his mechanical heart valve. Pt last ate at yesterday evening. Denies fevers, dizziness, CP, SOB, N/V, calf pain.    PMHx:  MI (myocardial infarction)  Atrial flutter  Aortic aneurysm  HTN (hypertension)    PSH:  AVR   Gastric Bypass  Nasal Septoplasty     Allergies:  No Known Allergies    Social: Pt is a smoker.    Labs:                        14.6   12.43 )-----------( 175      ( 04 Jan 2019 12:09 )             42.3     01-04    138  |  105  |  11  ----------------------------<  103<H>  4.6   |  28  |  0.96    Ca    9.0      04 Jan 2019 12:09    TPro  6.3  /  Alb  3.4  /  TBili  0.8  /  DBili  x   /  AST  21  /  ALT  19  /  AlkPhos  40  01-04    Imaging: None****    Vitals:  T(C): 37 (01-04-19 @ 12:12), Max: 37.3 (01-04-19 @ 10:24)  HR: 76 (01-04-19 @ 12:12) (71 - 76)  BP: 148/76 (01-04-19 @ 12:12) (145/87 - 148/76)  RR: 15 (01-04-19 @ 12:12) (15 - 16)  SpO2: 98% (01-04-19 @ 12:12) (97% - 98%)    Physical Exam:  Gen: NAD, AAOx3    RLE:   Skin intact  + Edema  Medial and posterior compartments of the thigh soft and compressible  Anterior compartment of the thigh firm but compressible  Extensor mechanism intact  No pain with passive flexion/extension of knee  +EHL/FHL/TA/GS  SILT L2-S1  +DP/PT Pulses  No calf TTP B/L Orthopaedic Surgery Consult Note    Pt is a 48y Male presenting with R thigh pain s/p lifting boxes on Weds. Pt is a community ambulatory at baseline. Pt has been able to bear weight on affected extremity since time of injury and has been ambulating with pain. Pain has been worsening since time of injury and is currently 10/10. Pt denies numbness, tingling, or paresthesias in affected limb. Pt denies trauma. Pt was seen by Dr. Rincon (Orthopaedic Surgeon) who diagnosed him with a Quad tear. He went for outpatient MRI this morning. Pt takes Warfarin for his mechanical heart valve. Pt last ate at yesterday evening. Denies fevers, dizziness, CP, SOB, N/V, calf pain.    PMHx:  MI (myocardial infarction)  Atrial flutter  Aortic aneurysm  HTN (hypertension)    PSH:  AVR   Gastric Bypass  Nasal Septoplasty     Allergies:  No Known Allergies    Social: Pt is a smoker.    Labs:                        14.6   12.43 )-----------( 175      ( 04 Jan 2019 12:09 )             42.3     01-04    138  |  105  |  11  ----------------------------<  103<H>  4.6   |  28  |  0.96    Ca    9.0      04 Jan 2019 12:09    TPro  6.3  /  Alb  3.4  /  TBili  0.8  /  DBili  x   /  AST  21  /  ALT  19  /  AlkPhos  40  01-04    Imaging: Outpatient MRI report and imaging reviewed. MRI of R thigh demonstrating intramuscular hematoma of quadriceps.    Vitals:  T(C): 37 (01-04-19 @ 12:12), Max: 37.3 (01-04-19 @ 10:24)  HR: 76 (01-04-19 @ 12:12) (71 - 76)  BP: 148/76 (01-04-19 @ 12:12) (145/87 - 148/76)  RR: 15 (01-04-19 @ 12:12) (15 - 16)  SpO2: 98% (01-04-19 @ 12:12) (97% - 98%)    Physical Exam:  Gen: NAD, AAOx3    RLE:   Skin intact  + Edema  Medial and posterior compartments of the thigh soft and compressible  Anterior compartment of the thigh firm but compressible  Extensor mechanism intact  No pain with passive flexion/extension of knee  +EHL/FHL/TA/GS  SILT L2-S1  +DP/PT Pulses  No calf TTP B/L

## 2019-01-04 NOTE — PROGRESS NOTE ADULT - ASSESSMENT
Pt is a 48y Male with a R thigh hematoma.  -Unlike to be compartment syndrome at this time.  -Pain control  -WBAT affected extremity  -Discussed signs and symptoms of compartment syndrome with patient. Pt informed to follow up immediately should these signs or symptoms develop. Pt expressed understanding and all questions were answered.  -    Lidia Schulte M.D.  PGY-1 Orthopaedic Surgery Pt is a 48y Male with a R thigh hematoma secondary to quad muscle tear.  -No clinical evidence of compartment syndrome at this time. However, discussed with patient that compartment syndrome could develop over time.  -Discussed signs and symptoms of compartment syndrome with patient. Pt informed to follow up immediately should these signs or symptoms develop. Pt expressed understanding and all questions were answered.  -Imaging: Outpatient MRI report and imaging reviewed. MRI of R thigh demonstrating intramuscular hematoma of quadriceps.  -Pain control  -Flexeril as needed  -WBAT affected extremity  -Follow up with Dr. Rincon as outpatient.  -May alternatively follow up with on-call attending, Dr. Beaver.  -Case discussed with Dr. Beaver who agrees with plan.      Lidia Schulte M.D.  PGY-1 Orthopaedic Surgery

## 2019-01-04 NOTE — ED ADULT NURSE NOTE - NSIMPLEMENTINTERV_GEN_ALL_ED
Implemented All Universal Safety Interventions:  [place] to call system. Call bell, personal items and telephone within reach. Instruct patient to call for assistance. Room bathroom lighting operational. Non-slip footwear when patient is off stretcher. Physically safe environment: no spills, clutter or unnecessary equipment. Stretcher in lowest position, wheels locked, appropriate side rails in place.

## 2019-01-04 NOTE — ED ADULT NURSE REASSESSMENT NOTE - NS ED NURSE REASSESS COMMENT FT1
rt foot skin warm color WNL  + rt pedal pulse. Reviewed s/s of compartment syndrome pt verbalized understanding instructed to return for new or worsening s/s

## 2019-01-04 NOTE — ED ADULT NURSE NOTE - CHIEF COMPLAINT QUOTE
right thigh pan since Wednesday went to [facility] dx muscle spasm seen by ortho on thursday dx strain/torn quad had MRI this AM "there is bleeding in my thigh"

## 2019-01-04 NOTE — ED ADULT TRIAGE NOTE - CHIEF COMPLAINT QUOTE
right thigh pan since Wednesday went to [facility] dx muscle spasm seen by ortho on thursday dx strain/torn quad had MRI this AM "there is bleeding in my thigh"

## 2019-03-13 ENCOUNTER — INPATIENT (INPATIENT)
Facility: HOSPITAL | Age: 49
LOS: 12 days | Discharge: HOME | DRG: 86 | End: 2019-03-26
Attending: NEUROLOGICAL SURGERY | Admitting: NEUROLOGICAL SURGERY
Payer: COMMERCIAL

## 2019-03-13 VITALS
HEART RATE: 56 BPM | TEMPERATURE: 98 F | SYSTOLIC BLOOD PRESSURE: 155 MMHG | OXYGEN SATURATION: 97 % | DIASTOLIC BLOOD PRESSURE: 81 MMHG | WEIGHT: 270.07 LBS | RESPIRATION RATE: 19 BRPM

## 2019-03-13 DIAGNOSIS — Z86.79 PERSONAL HISTORY OF OTHER DISEASES OF THE CIRCULATORY SYSTEM: ICD-10-CM

## 2019-03-13 DIAGNOSIS — S06.5X9A TRAUMATIC SUBDURAL HEMORRHAGE WITH LOSS OF CONSCIOUSNESS OF UNSPECIFIED DURATION, INITIAL ENCOUNTER: ICD-10-CM

## 2019-03-13 DIAGNOSIS — Z95.2 PRESENCE OF PROSTHETIC HEART VALVE: Chronic | ICD-10-CM

## 2019-03-13 DIAGNOSIS — Z95.2 PRESENCE OF PROSTHETIC HEART VALVE: ICD-10-CM

## 2019-03-13 LAB
ALBUMIN SERPL ELPH-MCNC: 4 G/DL — SIGNIFICANT CHANGE UP (ref 3.3–5)
ALP SERPL-CCNC: 31 U/L — LOW (ref 40–120)
ALT FLD-CCNC: 16 U/L — SIGNIFICANT CHANGE UP (ref 10–45)
ANION GAP SERPL CALC-SCNC: 13 MMOL/L — SIGNIFICANT CHANGE UP (ref 5–17)
APTT BLD: 55.3 SEC — HIGH (ref 27.5–36.3)
AST SERPL-CCNC: 20 U/L — SIGNIFICANT CHANGE UP (ref 10–40)
BASOPHILS # BLD AUTO: 0.1 K/UL — SIGNIFICANT CHANGE UP (ref 0–0.2)
BASOPHILS # BLD AUTO: 0.1 K/UL — SIGNIFICANT CHANGE UP (ref 0–0.2)
BASOPHILS NFR BLD AUTO: 0.6 % — SIGNIFICANT CHANGE UP (ref 0–2)
BASOPHILS NFR BLD AUTO: 0.8 % — SIGNIFICANT CHANGE UP (ref 0–2)
BILIRUB SERPL-MCNC: 0.4 MG/DL — SIGNIFICANT CHANGE UP (ref 0.2–1.2)
BLD GP AB SCN SERPL QL: NEGATIVE — SIGNIFICANT CHANGE UP
BUN SERPL-MCNC: 14 MG/DL — SIGNIFICANT CHANGE UP (ref 7–23)
CALCIUM SERPL-MCNC: 10 MG/DL — SIGNIFICANT CHANGE UP (ref 8.4–10.5)
CHLORIDE SERPL-SCNC: 99 MMOL/L — SIGNIFICANT CHANGE UP (ref 96–108)
CO2 SERPL-SCNC: 24 MMOL/L — SIGNIFICANT CHANGE UP (ref 22–31)
CREAT SERPL-MCNC: 0.83 MG/DL — SIGNIFICANT CHANGE UP (ref 0.5–1.3)
EOSINOPHIL # BLD AUTO: 0.3 K/UL — SIGNIFICANT CHANGE UP (ref 0–0.5)
EOSINOPHIL # BLD AUTO: 0.5 K/UL — SIGNIFICANT CHANGE UP (ref 0–0.5)
EOSINOPHIL NFR BLD AUTO: 3.1 % — SIGNIFICANT CHANGE UP (ref 0–6)
EOSINOPHIL NFR BLD AUTO: 3.6 % — SIGNIFICANT CHANGE UP (ref 0–6)
GLUCOSE SERPL-MCNC: 99 MG/DL — SIGNIFICANT CHANGE UP (ref 70–99)
HCT VFR BLD CALC: 48.8 % — SIGNIFICANT CHANGE UP (ref 39–50)
HCT VFR BLD CALC: 53.3 % — HIGH (ref 39–50)
HGB BLD-MCNC: 17.4 G/DL — HIGH (ref 13–17)
HGB BLD-MCNC: 18.3 G/DL — HIGH (ref 13–17)
INR BLD: 4.22 RATIO — HIGH (ref 0.88–1.16)
LYMPHOCYTES # BLD AUTO: 18.8 % — SIGNIFICANT CHANGE UP (ref 13–44)
LYMPHOCYTES # BLD AUTO: 2.4 K/UL — SIGNIFICANT CHANGE UP (ref 1–3.3)
LYMPHOCYTES # BLD AUTO: 2.7 K/UL — SIGNIFICANT CHANGE UP (ref 1–3.3)
LYMPHOCYTES # BLD AUTO: 25 % — SIGNIFICANT CHANGE UP (ref 13–44)
MCHC RBC-ENTMCNC: 32.8 PG — SIGNIFICANT CHANGE UP (ref 27–34)
MCHC RBC-ENTMCNC: 33.6 PG — SIGNIFICANT CHANGE UP (ref 27–34)
MCHC RBC-ENTMCNC: 34.4 GM/DL — SIGNIFICANT CHANGE UP (ref 32–36)
MCHC RBC-ENTMCNC: 35.6 GM/DL — SIGNIFICANT CHANGE UP (ref 32–36)
MCV RBC AUTO: 94.2 FL — SIGNIFICANT CHANGE UP (ref 80–100)
MCV RBC AUTO: 95.4 FL — SIGNIFICANT CHANGE UP (ref 80–100)
MONOCYTES # BLD AUTO: 0.7 K/UL — SIGNIFICANT CHANGE UP (ref 0–0.9)
MONOCYTES # BLD AUTO: 0.9 K/UL — SIGNIFICANT CHANGE UP (ref 0–0.9)
MONOCYTES NFR BLD AUTO: 6.6 % — SIGNIFICANT CHANGE UP (ref 2–14)
MONOCYTES NFR BLD AUTO: 7.2 % — SIGNIFICANT CHANGE UP (ref 2–14)
NEUTROPHILS # BLD AUTO: 7.1 K/UL — SIGNIFICANT CHANGE UP (ref 1.8–7.4)
NEUTROPHILS # BLD AUTO: 9 K/UL — HIGH (ref 1.8–7.4)
NEUTROPHILS NFR BLD AUTO: 64.5 % — SIGNIFICANT CHANGE UP (ref 43–77)
NEUTROPHILS NFR BLD AUTO: 69.7 % — SIGNIFICANT CHANGE UP (ref 43–77)
PLATELET # BLD AUTO: 187 K/UL — SIGNIFICANT CHANGE UP (ref 150–400)
PLATELET # BLD AUTO: 209 K/UL — SIGNIFICANT CHANGE UP (ref 150–400)
POTASSIUM SERPL-MCNC: 3.9 MMOL/L — SIGNIFICANT CHANGE UP (ref 3.5–5.3)
POTASSIUM SERPL-SCNC: 3.9 MMOL/L — SIGNIFICANT CHANGE UP (ref 3.5–5.3)
PROT SERPL-MCNC: 6.8 G/DL — SIGNIFICANT CHANGE UP (ref 6–8.3)
PROTHROM AB SERPL-ACNC: 50.3 SEC — HIGH (ref 10–12.9)
RBC # BLD: 5.18 M/UL — SIGNIFICANT CHANGE UP (ref 4.2–5.8)
RBC # BLD: 5.58 M/UL — SIGNIFICANT CHANGE UP (ref 4.2–5.8)
RBC # FLD: 12.7 % — SIGNIFICANT CHANGE UP (ref 10.3–14.5)
RBC # FLD: 13.4 % — SIGNIFICANT CHANGE UP (ref 10.3–14.5)
RH IG SCN BLD-IMP: POSITIVE — SIGNIFICANT CHANGE UP
SODIUM SERPL-SCNC: 136 MMOL/L — SIGNIFICANT CHANGE UP (ref 135–145)
WBC # BLD: 11 K/UL — HIGH (ref 3.8–10.5)
WBC # BLD: 12.9 K/UL — HIGH (ref 3.8–10.5)
WBC # FLD AUTO: 11 K/UL — HIGH (ref 3.8–10.5)
WBC # FLD AUTO: 12.9 K/UL — HIGH (ref 3.8–10.5)

## 2019-03-13 PROCEDURE — 99291 CRITICAL CARE FIRST HOUR: CPT

## 2019-03-13 PROCEDURE — 99223 1ST HOSP IP/OBS HIGH 75: CPT

## 2019-03-13 PROCEDURE — 70450 CT HEAD/BRAIN W/O DYE: CPT | Mod: 26

## 2019-03-13 RX ORDER — AMIODARONE HYDROCHLORIDE 400 MG/1
1 TABLET ORAL
Qty: 0 | Refills: 0 | COMMUNITY

## 2019-03-13 RX ORDER — WARFARIN SODIUM 2.5 MG/1
1 TABLET ORAL
Qty: 0 | Refills: 0 | COMMUNITY

## 2019-03-13 RX ORDER — PROTHROMBIN COMPLEX CONCENTRATE (HUMAN) 25.5; 16.5; 24; 22; 22; 26 [IU]/ML; [IU]/ML; [IU]/ML; [IU]/ML; [IU]/ML; [IU]/ML
1500 POWDER, FOR SOLUTION INTRAVENOUS ONCE
Qty: 0 | Refills: 0 | Status: COMPLETED | OUTPATIENT
Start: 2019-03-13 | End: 2019-03-13

## 2019-03-13 RX ORDER — AMIODARONE HYDROCHLORIDE 400 MG/1
200 TABLET ORAL DAILY
Qty: 0 | Refills: 0 | Status: DISCONTINUED | OUTPATIENT
Start: 2019-03-13 | End: 2019-03-13

## 2019-03-13 RX ORDER — ONDANSETRON 8 MG/1
4 TABLET, FILM COATED ORAL EVERY 6 HOURS
Qty: 0 | Refills: 0 | Status: DISCONTINUED | OUTPATIENT
Start: 2019-03-13 | End: 2019-03-26

## 2019-03-13 RX ORDER — DOCUSATE SODIUM 100 MG
100 CAPSULE ORAL THREE TIMES A DAY
Qty: 0 | Refills: 0 | Status: DISCONTINUED | OUTPATIENT
Start: 2019-03-13 | End: 2019-03-26

## 2019-03-13 RX ORDER — OXYCODONE HYDROCHLORIDE 5 MG/1
10 TABLET ORAL EVERY 4 HOURS
Qty: 0 | Refills: 0 | Status: DISCONTINUED | OUTPATIENT
Start: 2019-03-13 | End: 2019-03-19

## 2019-03-13 RX ORDER — PHYTONADIONE (VIT K1) 5 MG
5 TABLET ORAL ONCE
Qty: 0 | Refills: 0 | Status: COMPLETED | OUTPATIENT
Start: 2019-03-13 | End: 2019-03-13

## 2019-03-13 RX ORDER — DEXAMETHASONE 0.5 MG/5ML
4 ELIXIR ORAL
Qty: 0 | Refills: 0 | Status: DISCONTINUED | OUTPATIENT
Start: 2019-03-13 | End: 2019-03-19

## 2019-03-13 RX ORDER — ACETAMINOPHEN 500 MG
650 TABLET ORAL EVERY 6 HOURS
Qty: 0 | Refills: 0 | Status: DISCONTINUED | OUTPATIENT
Start: 2019-03-13 | End: 2019-03-13

## 2019-03-13 RX ORDER — LEVETIRACETAM 250 MG/1
500 TABLET, FILM COATED ORAL EVERY 12 HOURS
Qty: 0 | Refills: 0 | Status: DISCONTINUED | OUTPATIENT
Start: 2019-03-13 | End: 2019-03-26

## 2019-03-13 RX ORDER — OXYCODONE HYDROCHLORIDE 5 MG/1
5 TABLET ORAL EVERY 4 HOURS
Qty: 0 | Refills: 0 | Status: DISCONTINUED | OUTPATIENT
Start: 2019-03-13 | End: 2019-03-19

## 2019-03-13 RX ORDER — HYDROMORPHONE HYDROCHLORIDE 2 MG/ML
0.5 INJECTION INTRAMUSCULAR; INTRAVENOUS; SUBCUTANEOUS ONCE
Qty: 0 | Refills: 0 | Status: DISCONTINUED | OUTPATIENT
Start: 2019-03-13 | End: 2019-03-13

## 2019-03-13 RX ORDER — CYCLOBENZAPRINE HYDROCHLORIDE 10 MG/1
10 TABLET, FILM COATED ORAL THREE TIMES A DAY
Qty: 0 | Refills: 0 | Status: DISCONTINUED | OUTPATIENT
Start: 2019-03-13 | End: 2019-03-26

## 2019-03-13 RX ORDER — METOPROLOL TARTRATE 50 MG
50 TABLET ORAL
Qty: 0 | Refills: 0 | Status: DISCONTINUED | OUTPATIENT
Start: 2019-03-13 | End: 2019-03-19

## 2019-03-13 RX ORDER — SENNA PLUS 8.6 MG/1
2 TABLET ORAL AT BEDTIME
Qty: 0 | Refills: 0 | Status: DISCONTINUED | OUTPATIENT
Start: 2019-03-13 | End: 2019-03-16

## 2019-03-13 RX ORDER — ACETAMINOPHEN 500 MG
650 TABLET ORAL EVERY 6 HOURS
Qty: 0 | Refills: 0 | Status: DISCONTINUED | OUTPATIENT
Start: 2019-03-13 | End: 2019-03-26

## 2019-03-13 RX ADMIN — PROTHROMBIN COMPLEX CONCENTRATE (HUMAN) 400 INTERNATIONAL UNIT(S): 25.5; 16.5; 24; 22; 22; 26 POWDER, FOR SOLUTION INTRAVENOUS at 20:28

## 2019-03-13 RX ADMIN — HYDROMORPHONE HYDROCHLORIDE 0.5 MILLIGRAM(S): 2 INJECTION INTRAMUSCULAR; INTRAVENOUS; SUBCUTANEOUS at 19:41

## 2019-03-13 RX ADMIN — Medication 101 MILLIGRAM(S): at 20:46

## 2019-03-13 RX ADMIN — HYDROMORPHONE HYDROCHLORIDE 0.5 MILLIGRAM(S): 2 INJECTION INTRAMUSCULAR; INTRAVENOUS; SUBCUTANEOUS at 20:11

## 2019-03-13 NOTE — H&P ADULT - HISTORY OF PRESENT ILLNESS
p ([id number])     HPI: Lenny Person, 48M significant cardiac hx s/p mechanical valve on Coumadin got outpatient MRI for continued headaches after fall 1 month ago showing small R acute on chronic SDH stable on 4h CTH done here in ER. Intact on exam except mild headache and debatable LUE VERY subtle drift.

## 2019-03-13 NOTE — CONSULT NOTE ADULT - NSICDXPROBLEM_GEN_ALL_CORE_FT
PROBLEM DIAGNOSES  Problem: SDH (subdural hematoma)  Recommendation: --management as per neurosurgery  --stable on initial repeat scan    Problem: History of mechanical aortic valve replacement  Recommendation: --history of Bentall procedure, on coumadin  --goal INR typically 2.0-3.0, but clarify with cardiology/CT surgery if patient has alternative specific goal  --s/p A/C reversal with Kcentra on admission, repeat INR pending  --monitor INR closely. When cleared for anticoagulation by neurosurgery, will need to start heparin bridge to coumadin  --cardiology consult (patient sees Dr. Delvis Saini)    Problem: History of atrial flutter  Recommendation: --s/p prior ablation  --no longer taking amiodarone  --A/C on hold for SDH  --can continue with home metoprolol.

## 2019-03-13 NOTE — ED PROVIDER NOTE - ATTENDING CONTRIBUTION TO CARE
1 month of head ache. perhaps trauma that was very minor - unclear. on coumadin for avr. appearantly ich on outpt mr today  neuro intac except for slow speech  I reviewed the images from today mri, small sdh noted. dw Neurosurgery. given his ac, need to determine if the bleed is acute or not to assist in determining reversal of ac.

## 2019-03-13 NOTE — H&P ADULT - NSHPPHYSICALEXAM_GEN_ALL_CORE
PHYSICAL EXAMINATION:   T(C): 36.8 (03-13-19 @ 18:30), Max: 36.8 (03-13-19 @ 18:30)  HR: 56 (03-13-19 @ 18:30) (56 - 56)  BP: 148/95 (03-13-19 @ 18:30) (148/95 - 155/81)  RR: 18 (03-13-19 @ 18:30) (18 - 19)  SpO2: 98% (03-13-19 @ 18:30) (97% - 98%)  Wt(kg): --  Weight (kg): 122.5 (03-13 @ 18:01)    Neurologic Examination:           AOx3, FC, PERRL, EOMI, no facial   5/5 throughout, possible subtle LUE drift  SILT

## 2019-03-13 NOTE — ED PROVIDER NOTE - PROGRESS NOTE DETAILS
dw Neurosurgery, has reviewed ct. hold warfarin, do not reverse ac for now given no deficits. will see pt promptly for further recs. pt on coumadin with subdural bleed, no surgical plan though has midline shift, no neuro focal deficit. Plan to observe inpatient under neurosurgery pt on coumadin with subdural bleed, has midline shift, no neuro focal deficit. Plan to observe inpatient under neurosurgery, INR 4 hence plan to reverse with Kcentra, no surgical intervention at the time pt on coumadin with subdural bleed, has midline shift, no neuro focal deficit. Plan to observe inpatient under neurosurgery, INR 4 hence plan to reverse with Kcentra and vit k at Neurosurgery recs, no surgical intervention at the time. they rec for floor admit after reversal

## 2019-03-13 NOTE — CONSULT NOTE ADULT - SUBJECTIVE AND OBJECTIVE BOX
CC: headache    HPI:  48M PMH aflutter s/p ablation (2017), aortic aneurysm s/p Bentall procedure with mechanical aortic valve (2013) on coumadin, gastric bypass presents with headaches after outpatient MRI showing small R acute on chronic SDH. Patient had a mechanical fall about one month ago where he tripped at work. Did not have LOC and did not strike his head. Has had mild to moderate headaches over the past month that are located diffusely, dull, intermittent, but have been worsening in intensity and frequency. MRI performed for headaches outpatient. Patient reports he has increased difficulty reading         PAST MEDICAL & SURGICAL HISTORY:  Atrial flutter  Aortic aneurysm  HTN (hypertension)  Arrhythmia  S/P AVR  Gastric bypass status for obesity  H/O nasal septoplasty    Review of Systems:   CONSTITUTIONAL: No fever, weight loss, or fatigue  EYES: No eye pain, visual disturbances, or discharge  ENMT:  No difficulty hearing, tinnitus, vertigo; No sinus or throat pain  NECK: No pain or stiffness  RESPIRATORY: No cough, wheezing, chills or hemoptysis; No shortness of breath  CARDIOVASCULAR: No chest pain, palpitations, dizziness, or leg swelling  GASTROINTESTINAL: No abdominal or epigastric pain. No nausea, vomiting, or hematemesis; No diarrhea or constipation. No melena or hematochezia.  GENITOURINARY: No dysuria, frequency, hematuria, or incontinence  NEUROLOGICAL: +HEADACHE; No memory loss, loss of strength, numbness, or tremors  SKIN: No itching, burning, rashes, or lesions   LYMPH NODES: No enlarged glands  ENDOCRINE: No heat or cold intolerance; No hair loss  MUSCULOSKELETAL: No joint pain or swelling; No muscle, back, or extremity pain  PSYCHIATRIC: No depression, anxiety, mood swings, or difficulty sleeping  HEME/LYMPH: No easy bruising, or bleeding gums  ALLERY AND IMMUNOLOGIC: No hives or eczema  [X] all other systems negative or as per HPI    Allergies  No Known Allergies    Social History: lives with wife, employed, denies tobacco, ETOH, drug use    FAMILY HISTORY:  Family history of stroke due to aneurysm  FH: MI (myocardial infarction)    HOME MEDICATIONS:  warfarin 15 mg M-F, 10 mg Sat, Sun; metoprolol tartrate 50mg BID, multivitamin    MEDICATIONS  (STANDING):  dexamethasone     Tablet 4 milliGRAM(s) Oral two times a day  docusate sodium 100 milliGRAM(s) Oral three times a day  levETIRAcetam 500 milliGRAM(s) Oral every 12 hours  metoprolol tartrate 50 milliGRAM(s) Oral two times a day    MEDICATIONS  (PRN):  acetaminophen   Tablet .. 650 milliGRAM(s) Oral every 6 hours PRN Temp greater or equal to 38C (100.4F), Mild Pain (1 - 3)  cyclobenzaprine 10 milliGRAM(s) Oral three times a day PRN Muscle Spasm  ondansetron   Disintegrating Tablet 4 milliGRAM(s) Oral every 6 hours PRN Nausea  oxyCODONE    IR 5 milliGRAM(s) Oral every 4 hours PRN Moderate Pain (4 - 6)  oxyCODONE    IR 10 milliGRAM(s) Oral every 4 hours PRN Severe Pain (7 - 10)  senna 2 Tablet(s) Oral at bedtime PRN Constipation    Vital Signs Last 24 Hrs  T(C): 36.8 (03-13-19 @ 19:20)  T(F): 98.2 (03-13-19 @ 19:20), Max: 98.2 (03-13-19 @ 18:30)  HR: 59 (03-13-19 @ 19:20) (56 - 59)  BP: 144/80 (03-13-19 @ 19:20)  BP(mean): --  RR: 18 (03-13-19 @ 19:20) (18 - 19)  SpO2: 98% (03-13-19 @ 19:20) (97% - 98%)  Wt(kg): --    PHYSICAL EXAM:  GENERAL: NAD, well-developed  HEAD:  Atraumatic, Normocephalic  EYES: EOMI, PERRLA, conjunctiva and sclera clear  NECK: Supple, No JVD  CHEST/LUNG: Clear to auscultation bilaterally; No wheeze  HEART: Regular rate and rhythm; No murmurs, rubs, or gallops  ABDOMEN: Soft, Nontender, Nondistended; Bowel sounds present  EXTREMITIES:  2+ Peripheral Pulses, No clubbing, cyanosis, or edema  PSYCH: AAOx3  NEUROLOGY: non-focal  SKIN: No rashes or lesions    EKG, labs, and imaging personally reviewed and interpreted.     LABS:             18.3   12.9  )-----------( 209      ( 13 Mar 2019 19:28 )             53.3     136  |  99  |  14  ----------------------------<  99  3.9   |  24  |  0.83    Ca    10.0      13 Mar 2019 19:28    TPro  6.8  /  Alb  4.0  /  TBili  0.4  /  DBili  x   /  AST  20  /  ALT  16  /  AlkPhos  31<L>  03-13    PT/INR - ( 13 Mar 2019 19:28 )   PT: 50.3 sec;   INR: 4.22 ratio    PTT - ( 13 Mar 2019 19:28 )  PTT:55.3 sec    RADIOLOGY & ADDITIONAL TESTS:  Imaging Personally Reviewed:    EXAM:  CT BRAIN                        PROCEDURE DATE:  03/13/2019    INTERPRETATION:  HISTORY: Intracranial hemorrhage.    Technique: Noncontrast CT of the head was performed.  Multiple contiguous axial images were acquired from the skull base to the   vertex without the administration of intravenous contrast. Coronal and   sagittal reformations were made.    COMPARISON: None.    FINDINGS:  Bilateral subdural hemorrhages are seen. On the right, a holohemispheric   subdural hematoma measures up to 0.8 cm in greatest depth with areas of   hyperdense and isodense hemorrhage. A left cerebral convexity subdural   hematoma measures up to 0.3 cm. A small interhemispheric acute subdural   hematoma measures up to 0.3 cm.    Leftward midline shift measures up to 0.4 cm. There is partial effacement   of basilar cisterns including the quadrigeminal plate cistern and   suprasellar cistern. There is however, no evidence for ventriculomegaly.    There is no intraparenchymal hemorrhage. There is no evidence of acute   transcortical territorial infarction.    The calvarium is intact. The visualized intraorbital compartments, and   tympanomastoid cavities appear free of acute disease. There is mild   mucosal thickening within the paranasal sinuses.    IMPRESSION:  Subdural hematomas as described with 0.4 cm leftward midline shift and   partial effacement of basilar cisterns as described.    Consultant(s) Notes Reviewed: Yes, Dr. Huang (neurosurgery)  Care Discussed with Consultants/Other Providers: Yes, Dr. Huang (neurosurgery)  Outpatient and prior hospitalization records reviewed: Yes CC: headache    HPI:  48M PMH aflutter s/p ablation (2017), aortic aneurysm s/p Bentall procedure with mechanical aortic valve (2013) on coumadin, gastric bypass presents with headaches after outpatient MRI showing small R acute on chronic SDH. Patient had a mechanical fall about one month ago where he tripped at work. Did not have LOC and did not strike his head. Has had mild to moderate headaches over the past month that are located diffusely, dull, intermittent, but have been worsening in intensity and frequency. MRI performed for headaches outpatient. Patient reports he has increased difficulty reading when he has the headaches, but otherwise denies vision changes, dizziness, vertigo, chest pain, SOB, NVD, abdominal pain, weakness, numbness, tingling, dysphagia, dysarthria, difficulty ambulating.     PAST MEDICAL & SURGICAL HISTORY:  Atrial flutter  Aortic aneurysm  HTN (hypertension)  Arrhythmia  S/P AVR  Gastric bypass status for obesity  H/O nasal septoplasty    Review of Systems:   CONSTITUTIONAL: No fever, weight loss, or fatigue  EYES: No eye pain, visual disturbances, or discharge  ENMT:  No difficulty hearing, tinnitus, vertigo; No sinus or throat pain  NECK: No pain or stiffness  RESPIRATORY: No cough, wheezing, chills or hemoptysis; No shortness of breath  CARDIOVASCULAR: No chest pain, palpitations, dizziness, or leg swelling  GASTROINTESTINAL: No abdominal or epigastric pain. No nausea, vomiting, or hematemesis; No diarrhea or constipation. No melena or hematochezia.  GENITOURINARY: No dysuria, frequency, hematuria, or incontinence  NEUROLOGICAL: +HEADACHE; No memory loss, loss of strength, numbness, or tremors  SKIN: No itching, burning, rashes, or lesions   LYMPH NODES: No enlarged glands  ENDOCRINE: No heat or cold intolerance; No hair loss  MUSCULOSKELETAL: No joint pain or swelling; No muscle, back, or extremity pain  PSYCHIATRIC: No depression, anxiety, mood swings, or difficulty sleeping  HEME/LYMPH: No easy bruising, or bleeding gums  ALLERY AND IMMUNOLOGIC: No hives or eczema  [X] all other systems negative or as per HPI    Allergies  No Known Allergies    Social History: lives with wife, employed, denies tobacco, ETOH, drug use    FAMILY HISTORY:  Family history of stroke due to aneurysm  FH: MI (myocardial infarction)    HOME MEDICATIONS:  warfarin 15 mg M-F, 10 mg Sat, Sun; metoprolol tartrate 50mg BID, multivitamin    MEDICATIONS  (STANDING):  dexamethasone     Tablet 4 milliGRAM(s) Oral two times a day  docusate sodium 100 milliGRAM(s) Oral three times a day  levETIRAcetam 500 milliGRAM(s) Oral every 12 hours  metoprolol tartrate 50 milliGRAM(s) Oral two times a day    MEDICATIONS  (PRN):  acetaminophen   Tablet .. 650 milliGRAM(s) Oral every 6 hours PRN Temp greater or equal to 38C (100.4F), Mild Pain (1 - 3)  cyclobenzaprine 10 milliGRAM(s) Oral three times a day PRN Muscle Spasm  ondansetron   Disintegrating Tablet 4 milliGRAM(s) Oral every 6 hours PRN Nausea  oxyCODONE    IR 5 milliGRAM(s) Oral every 4 hours PRN Moderate Pain (4 - 6)  oxyCODONE    IR 10 milliGRAM(s) Oral every 4 hours PRN Severe Pain (7 - 10)  senna 2 Tablet(s) Oral at bedtime PRN Constipation    Vital Signs Last 24 Hrs  T(C): 36.8 (03-13-19 @ 19:20)  T(F): 98.2 (03-13-19 @ 19:20), Max: 98.2 (03-13-19 @ 18:30)  HR: 59 (03-13-19 @ 19:20) (56 - 59)  BP: 144/80 (03-13-19 @ 19:20)  BP(mean): --  RR: 18 (03-13-19 @ 19:20) (18 - 19)  SpO2: 98% (03-13-19 @ 19:20) (97% - 98%)  Wt(kg): --    PHYSICAL EXAM:  GENERAL: NAD, well-developed  HEAD:  Atraumatic, Normocephalic  EYES: EOMI, PERRLA, conjunctiva and sclera clear  NECK: Supple, No JVD  CHEST/LUNG: Clear to auscultation bilaterally; No wheeze  HEART: Regular rate and rhythm; No murmurs, rubs, or gallops  ABDOMEN: Soft, Nontender, Nondistended; Bowel sounds present  EXTREMITIES:  2+ Peripheral Pulses, No clubbing, cyanosis, or edema  PSYCH: AAOx3  NEUROLOGY: non-focal  SKIN: No rashes or lesions    EKG, labs, and imaging personally reviewed and interpreted.     LABS:             18.3   12.9  )-----------( 209      ( 13 Mar 2019 19:28 )             53.3     136  |  99  |  14  ----------------------------<  99  3.9   |  24  |  0.83    Ca    10.0      13 Mar 2019 19:28    TPro  6.8  /  Alb  4.0  /  TBili  0.4  /  DBili  x   /  AST  20  /  ALT  16  /  AlkPhos  31<L>  03-13    PT/INR - ( 13 Mar 2019 19:28 )   PT: 50.3 sec;   INR: 4.22 ratio    PTT - ( 13 Mar 2019 19:28 )  PTT:55.3 sec    RADIOLOGY & ADDITIONAL TESTS:  Imaging Personally Reviewed:    EXAM:  CT BRAIN                        PROCEDURE DATE:  03/13/2019    INTERPRETATION:  HISTORY: Intracranial hemorrhage.    Technique: Noncontrast CT of the head was performed.  Multiple contiguous axial images were acquired from the skull base to the   vertex without the administration of intravenous contrast. Coronal and   sagittal reformations were made.    COMPARISON: None.    FINDINGS:  Bilateral subdural hemorrhages are seen. On the right, a holohemispheric   subdural hematoma measures up to 0.8 cm in greatest depth with areas of   hyperdense and isodense hemorrhage. A left cerebral convexity subdural   hematoma measures up to 0.3 cm. A small interhemispheric acute subdural   hematoma measures up to 0.3 cm.    Leftward midline shift measures up to 0.4 cm. There is partial effacement   of basilar cisterns including the quadrigeminal plate cistern and   suprasellar cistern. There is however, no evidence for ventriculomegaly.    There is no intraparenchymal hemorrhage. There is no evidence of acute   transcortical territorial infarction.    The calvarium is intact. The visualized intraorbital compartments, and   tympanomastoid cavities appear free of acute disease. There is mild   mucosal thickening within the paranasal sinuses.    IMPRESSION:  Subdural hematomas as described with 0.4 cm leftward midline shift and   partial effacement of basilar cisterns as described.    Consultant(s) Notes Reviewed: Yes, Dr. Huang (neurosurgery)  Care Discussed with Consultants/Other Providers: Yes, Dr. Huang (neurosurgery)  Outpatient and prior hospitalization records reviewed: Yes

## 2019-03-13 NOTE — ED PROVIDER NOTE - PHYSICAL EXAMINATION
PHYSICAL EXAM:  GENERAL: NAD, well-developed/nourished and groomed, afebrile  HEAD:  Atraumatic, Normocephalic  EYES: EOMI, PERRLA, conjunctiva and sclera clear  NECK: Supple, No JVD, no thyroid enlargement  CHEST/LUNG: Clear to auscultation bilaterally; No wheeze/rhonchi/rales  HEART: Regular rate and rhythm; No murmurs, rubs, or gallops  ABDOMEN: Soft, Nontender, Nondistended; Bowel sounds present  EXTREMITIES:  2+ Peripheral Pulses, No clubbing, cyanosis, or edema  PSYCH: appropriate mood and affect for age and condition  NEUROLOGY: AAO*3, non-focal  SKIN: No rashes or lesions

## 2019-03-13 NOTE — ED PROVIDER NOTE - CARE PLAN
Principal Discharge DX:	Subdural hematoma  Goal:	neurological monitoring  Assessment and plan of treatment:	pt on coumadin for mechanical valve, no reversal at the point  Secondary Diagnosis:	S/P AVR

## 2019-03-13 NOTE — ED PROVIDER NOTE - CRITICAL CARE PROVIDED
additional history taking/consultation with other physicians/direct patient care (not related to procedure)/interpretation of diagnostic studies

## 2019-03-13 NOTE — ED ADULT NURSE NOTE - OBJECTIVE STATEMENT
47 y/o male with PMH HTN aflutter mechanical heart valve on coumadin presenting to ED from PCP office for head bleed. Pt states "I fell a month ago, it wasn't even that bad but I've had these on and off pressure headaches in the past month. My doctor sent me for an MRI and told me that I had a brain bleed. They sent me here." Upon exam pt A&Ox3 gross neuro intact, 3mm PERRLA, strength equal in all extremities, no facial droop noted, no arm drift/ leg drop noted,  lungs cta bilaterally, no difficulty speaking in complete sentences, s1s2 heart sounds heard,  abdomen soft nontender nondistended, skin intact. Pt denies chest pain, sob, n/v/d, abdominal pain, f/c, urinary symptoms, hematuria. Seen by MD. Labs drawn & sent.

## 2019-03-13 NOTE — ED PROVIDER NOTE - OBJECTIVE STATEMENT
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 Pt was referred by his PCP to ER for bleed found on MRI head. Pt was under work up for headaches for a month, intermittent, dull and worsening in intensity. Pt denies any focal neurological symptoms, no vision changes, no speech changes. No neck pain, fever with chills, no sick contacts/trauma/ travel.       PCP Dr Burger [facility]  [phone]  Dr [facility] [phone]

## 2019-03-13 NOTE — ED PROVIDER NOTE - FAMILY HISTORY
FH: MI (myocardial infarction)     Father  Still living? No  Family history of stroke due to aneurysm, Age at diagnosis: Age Unknown

## 2019-03-13 NOTE — H&P ADULT - ASSESSMENT
PLAN  - admit to floor  - Hold Coumadin, possib reverse if INR high  - CTH tomorrow  - steroids 4bid  - plan to transfer to medicine for management of AC long term  - keppra

## 2019-03-14 LAB
INR BLD: 1.17 RATIO — HIGH (ref 0.88–1.16)
INR BLD: 1.25 RATIO — HIGH (ref 0.88–1.16)
INR BLD: 1.71 RATIO — HIGH (ref 0.88–1.16)
PROTHROM AB SERPL-ACNC: 13.4 SEC — HIGH (ref 10–12.9)
PROTHROM AB SERPL-ACNC: 14.3 SEC — HIGH (ref 10–12.9)
PROTHROM AB SERPL-ACNC: 19.8 SEC — HIGH (ref 10–12.9)

## 2019-03-14 PROCEDURE — 70450 CT HEAD/BRAIN W/O DYE: CPT | Mod: 26

## 2019-03-14 PROCEDURE — 99233 SBSQ HOSP IP/OBS HIGH 50: CPT

## 2019-03-14 PROCEDURE — 93306 TTE W/DOPPLER COMPLETE: CPT | Mod: 26

## 2019-03-14 PROCEDURE — 99232 SBSQ HOSP IP/OBS MODERATE 35: CPT

## 2019-03-14 RX ORDER — PANTOPRAZOLE SODIUM 20 MG/1
40 TABLET, DELAYED RELEASE ORAL
Qty: 0 | Refills: 0 | Status: DISCONTINUED | OUTPATIENT
Start: 2019-03-14 | End: 2019-03-26

## 2019-03-14 RX ORDER — PROTHROMBIN COMPLEX CONCENTRATE (HUMAN) 25.5; 16.5; 24; 22; 22; 26 [IU]/ML; [IU]/ML; [IU]/ML; [IU]/ML; [IU]/ML; [IU]/ML
2000 POWDER, FOR SOLUTION INTRAVENOUS ONCE
Qty: 0 | Refills: 0 | Status: COMPLETED | OUTPATIENT
Start: 2019-03-14 | End: 2019-03-14

## 2019-03-14 RX ADMIN — Medication 100 MILLIGRAM(S): at 14:00

## 2019-03-14 RX ADMIN — Medication 4 MILLIGRAM(S): at 18:01

## 2019-03-14 RX ADMIN — Medication 50 MILLIGRAM(S): at 05:49

## 2019-03-14 RX ADMIN — LEVETIRACETAM 500 MILLIGRAM(S): 250 TABLET, FILM COATED ORAL at 18:01

## 2019-03-14 RX ADMIN — Medication 100 MILLIGRAM(S): at 05:47

## 2019-03-14 RX ADMIN — Medication 50 MILLIGRAM(S): at 18:01

## 2019-03-14 RX ADMIN — OXYCODONE HYDROCHLORIDE 10 MILLIGRAM(S): 5 TABLET ORAL at 04:07

## 2019-03-14 RX ADMIN — Medication 100 MILLIGRAM(S): at 23:26

## 2019-03-14 RX ADMIN — Medication 4 MILLIGRAM(S): at 05:47

## 2019-03-14 RX ADMIN — PANTOPRAZOLE SODIUM 40 MILLIGRAM(S): 20 TABLET, DELAYED RELEASE ORAL at 08:29

## 2019-03-14 RX ADMIN — Medication 100 MILLIGRAM(S): at 22:00

## 2019-03-14 RX ADMIN — OXYCODONE HYDROCHLORIDE 10 MILLIGRAM(S): 5 TABLET ORAL at 00:37

## 2019-03-14 RX ADMIN — OXYCODONE HYDROCHLORIDE 10 MILLIGRAM(S): 5 TABLET ORAL at 00:07

## 2019-03-14 RX ADMIN — PROTHROMBIN COMPLEX CONCENTRATE (HUMAN) 400 INTERNATIONAL UNIT(S): 25.5; 16.5; 24; 22; 22; 26 POWDER, FOR SOLUTION INTRAVENOUS at 01:18

## 2019-03-14 RX ADMIN — OXYCODONE HYDROCHLORIDE 10 MILLIGRAM(S): 5 TABLET ORAL at 23:47

## 2019-03-14 RX ADMIN — OXYCODONE HYDROCHLORIDE 10 MILLIGRAM(S): 5 TABLET ORAL at 04:55

## 2019-03-14 RX ADMIN — LEVETIRACETAM 500 MILLIGRAM(S): 250 TABLET, FILM COATED ORAL at 05:47

## 2019-03-14 NOTE — PROGRESS NOTE ADULT - SUBJECTIVE AND OBJECTIVE BOX
Chief Complaint: headaches    HPI:  Patient is a 48 year old male with hx of MI and mechanical aortic valve on coumadin with persistent headaches after a fall one month prior.  MRI was done as outpatient showing subdural hematoma.  Presented to ED afterwards and admitted to the neurosurgery service for further management.      OVERNIGHT EVENTS:  No acute events overnight.  Patient had repeat CT head this am.  Tolerating diet.  Feels well, no current complaints.    Vital Signs Last 24 Hrs  T(C): 37.1 (14 Mar 2019 12:35), Max: 37.1 (14 Mar 2019 12:35)  T(F): 98.7 (14 Mar 2019 12:35), Max: 98.7 (14 Mar 2019 12:35)  HR: 60 (14 Mar 2019 12:35) (54 - 73)  BP: 118/76 (14 Mar 2019 12:35) (118/76 - 155/81)  BP(mean): --  RR: 18 (14 Mar 2019 12:35) (17 - 19)  SpO2: 94% (14 Mar 2019 12:35) (94% - 98%)      PHYSICAL EXAM:  Neurological: awake, alert, oriented x3, follows commands, speech clear and fluent, perrl, eomi, face symmetric, tongue midline, no drift b/l, moves all extremities x4 w/ 5/5 strength throughout, sensation present, intact, equal throughout    Cardiovascular: +s1, s2  Respiratory: clear to auscultation b/l  Gastrointestinal: soft, non-distended, non-tender  Genitourinary: +voiding    TUBES/LINES:  [x] none    DIET:  [x] regular    LABS:                        17.4   11.0  )-----------( 187      ( 13 Mar 2019 23:42 )             48.8     03-13    136  |  99  |  14  ----------------------------<  99  3.9   |  24  |  0.83    Ca    10.0      13 Mar 2019 19:28    TPro  6.8  /  Alb  4.0  /  TBili  0.4  /  DBili  x   /  AST  20  /  ALT  16  /  AlkPhos  31<L>  03-13    PT/INR - ( 14 Mar 2019 06:37 )   PT: 14.3 sec;   INR: 1.25 ratio         PTT - ( 13 Mar 2019 19:28 )  PTT:55.3 sec        Allergies    No Known Allergies        MEDICATIONS:  Antibiotics:  none    Neuro:  acetaminophen   Tablet .. 650 milliGRAM(s) Oral every 6 hours PRN  cyclobenzaprine 10 milliGRAM(s) Oral three times a day PRN  levETIRAcetam 500 milliGRAM(s) Oral every 12 hours  ondansetron   Disintegrating Tablet 4 milliGRAM(s) Oral every 6 hours PRN  oxyCODONE    IR 5 milliGRAM(s) Oral every 4 hours PRN  oxyCODONE    IR 10 milliGRAM(s) Oral every 4 hours PRN    Anticoagulation:  currently held secondary to subdural hematoma    OTHER:  dexamethasone     Tablet 4 milliGRAM(s) Oral two times a day  docusate sodium 100 milliGRAM(s) Oral three times a day  metoprolol tartrate 50 milliGRAM(s) Oral two times a day  pantoprazole    Tablet 40 milliGRAM(s) Oral before breakfast  senna 2 Tablet(s) Oral at bedtime PRN    IVF:  none    CULTURES:  none    RADIOLOGY & ADDITIONAL TESTS:  CT head (3/14): stable

## 2019-03-14 NOTE — CONSULT NOTE ADULT - ASSESSMENT
aflutter   s/p ablation     mechanical avr  hemodynamically stable   off a/c given SDH   fu with neurosurgery   resume a/c once deemed safe   obtain echo    SDH  asymptomatic  plan as above
48M PMH aflutter s/p ablation (2017), aortic aneurysm s/p Bentall procedure with mechanical aortic valve (2013) on coumadin, gastric bypass presents with headaches after outpatient MRI showing small R acute on chronic SDH.

## 2019-03-14 NOTE — PROGRESS NOTE ADULT - ASSESSMENT
48M PMH aflutter s/p ablation (2017), aortic aneurysm s/p Bentall procedure with mechanical aortic valve (2013) on coumadin, gastric bypass presents with headaches after outpatient MRI showing small R acute on chronic SDH.

## 2019-03-14 NOTE — PROGRESS NOTE ADULT - SUBJECTIVE AND OBJECTIVE BOX
Nabil Mcguire MD  ([facility] Hospitalist)  Pager [phone]  (During off hours please page [phone])    *** INCOMPLETE. NOTE IN PROGRESS. ***    Patient is a 48y old  Male who presents with a chief complaint of subdural hematoma (14 Mar 2019 13:47)      SUBJECTIVE / OVERNIGHT EVENTS:    MEDICATIONS  (STANDING):  dexamethasone     Tablet 4 milliGRAM(s) Oral two times a day  docusate sodium 100 milliGRAM(s) Oral three times a day  levETIRAcetam 500 milliGRAM(s) Oral every 12 hours  metoprolol tartrate 50 milliGRAM(s) Oral two times a day  pantoprazole    Tablet 40 milliGRAM(s) Oral before breakfast    MEDICATIONS  (PRN):  acetaminophen   Tablet .. 650 milliGRAM(s) Oral every 6 hours PRN Temp greater or equal to 38C (100.4F), Mild Pain (1 - 3)  cyclobenzaprine 10 milliGRAM(s) Oral three times a day PRN Muscle Spasm  ondansetron   Disintegrating Tablet 4 milliGRAM(s) Oral every 6 hours PRN Nausea  oxyCODONE    IR 5 milliGRAM(s) Oral every 4 hours PRN Moderate Pain (4 - 6)  oxyCODONE    IR 10 milliGRAM(s) Oral every 4 hours PRN Severe Pain (7 - 10)  senna 2 Tablet(s) Oral at bedtime PRN Constipation      Vital Signs Last 24 Hrs  T(C): 36.7 (14 Mar 2019 16:19), Max: 37.1 (14 Mar 2019 12:35)  T(F): 98.1 (14 Mar 2019 16:19), Max: 98.7 (14 Mar 2019 12:35)  HR: 64 (14 Mar 2019 16:19) (54 - 73)  BP: 129/74 (14 Mar 2019 16:19) (118/76 - 155/81)  BP(mean): --  RR: 18 (14 Mar 2019 16:19) (17 - 19)  SpO2: 94% (14 Mar 2019 16:19) (94% - 98%)      I&O's Summary    13 Mar 2019 07:01  -  14 Mar 2019 07:00  --------------------------------------------------------  IN: 280 mL / OUT: 450 mL / NET: -170 mL    14 Mar 2019 07:01  -  14 Mar 2019 17:33  --------------------------------------------------------  IN: 720 mL / OUT: 0 mL / NET: 720 mL        PHYSICAL EXAM:  GENERAL: NAD, well-developed  HEAD:  Atraumatic, Normocephalic  EYES: EOMI, PERRLA, conjunctiva and sclera clear  NECK: Supple, No JVD  CHEST/LUNG: Clear to auscultation bilaterally; No wheeze  HEART: Regular rate and rhythm; No murmurs, rubs, or gallops  ABDOMEN: Soft, Nontender, Nondistended; Bowel sounds present  EXTREMITIES:  2+ Peripheral Pulses, No clubbing, cyanosis, or edema  PSYCH: Calm  NEUROLOGY: non-focal, AOx3  SKIN: No rashes or lesions    LABS:                        17.4   11.0  )-----------( 187      ( 13 Mar 2019 23:42 )             48.8     03-13    136  |  99  |  14  ----------------------------<  99  3.9   |  24  |  0.83    Ca    10.0      13 Mar 2019 19:28    TPro  6.8  /  Alb  4.0  /  TBili  0.4  /  DBili  x   /  AST  20  /  ALT  16  /  AlkPhos  31<L>  03-13    PT/INR - ( 14 Mar 2019 06:37 )   PT: 14.3 sec;   INR: 1.25 ratio    PTT - ( 13 Mar 2019 19:28 )  PTT:55.3 sec      RADIOLOGY & ADDITIONAL TESTS:    Imaging Personally Reviewed:   CT head: No significant interval change from 3/13/2019. Similar-appearing right frontotemporal mixed density subdural hemorrhage measuring 1.0 cm in greatest depth. Similar thin acute subdural hemorrhage in the interhemispheric fissure and along the tentorial leaves.No significant midline shift. No hydrocephalus.    Consultant(s) Notes Reviewed: Cardiology, neurosurgery     Care Discussed with Consultants/Other Providers: neurosurgery re anticoagulation Nabil Mcguire MD  ([facility] Hospitalist)  Pager [phone]  (During off hours please page [phone])    Patient is a 48y old  Male who presents with a chief complaint of subdural hematoma (14 Mar 2019 13:47)      SUBJECTIVE / OVERNIGHT EVENTS: No events overnight. No complaints of headache.     MEDICATIONS  (STANDING):  dexamethasone     Tablet 4 milliGRAM(s) Oral two times a day  docusate sodium 100 milliGRAM(s) Oral three times a day  levETIRAcetam 500 milliGRAM(s) Oral every 12 hours  metoprolol tartrate 50 milliGRAM(s) Oral two times a day  pantoprazole    Tablet 40 milliGRAM(s) Oral before breakfast    MEDICATIONS  (PRN):  acetaminophen   Tablet .. 650 milliGRAM(s) Oral every 6 hours PRN Temp greater or equal to 38C (100.4F), Mild Pain (1 - 3)  cyclobenzaprine 10 milliGRAM(s) Oral three times a day PRN Muscle Spasm  ondansetron   Disintegrating Tablet 4 milliGRAM(s) Oral every 6 hours PRN Nausea  oxyCODONE    IR 5 milliGRAM(s) Oral every 4 hours PRN Moderate Pain (4 - 6)  oxyCODONE    IR 10 milliGRAM(s) Oral every 4 hours PRN Severe Pain (7 - 10)  senna 2 Tablet(s) Oral at bedtime PRN Constipation      Vital Signs Last 24 Hrs  T(C): 36.7 (14 Mar 2019 16:19), Max: 37.1 (14 Mar 2019 12:35)  T(F): 98.1 (14 Mar 2019 16:19), Max: 98.7 (14 Mar 2019 12:35)  HR: 64 (14 Mar 2019 16:19) (54 - 73)  BP: 129/74 (14 Mar 2019 16:19) (118/76 - 155/81)  BP(mean): --  RR: 18 (14 Mar 2019 16:19) (17 - 19)  SpO2: 94% (14 Mar 2019 16:19) (94% - 98%)      I&O's Summary    13 Mar 2019 07:01  -  14 Mar 2019 07:00  --------------------------------------------------------  IN: 280 mL / OUT: 450 mL / NET: -170 mL    14 Mar 2019 07:01  -  14 Mar 2019 17:33  --------------------------------------------------------  IN: 720 mL / OUT: 0 mL / NET: 720 mL        PHYSICAL EXAM:  GENERAL: NAD, well-developed  HEAD:  Atraumatic, Normocephalic  EYES: EOMI, conjunctiva and sclera clear  NECK: Supple, No JVD  CHEST/LUNG: Clear to auscultation bilaterally; No wheeze  HEART: Loud mechanical click over aortic area, Regular rate and rhythm; No murmurs.  ABDOMEN: Soft, Nontender, Nondistended; Bowel sounds present  EXTREMITIES:  No clubbing, cyanosis, or edema  PSYCH: Calm  NEUROLOGY: non-focal, AOx3  SKIN: No rashes or lesions    LABS:                        17.4   11.0  )-----------( 187      ( 13 Mar 2019 23:42 )             48.8     03-13    136  |  99  |  14  ----------------------------<  99  3.9   |  24  |  0.83    Ca    10.0      13 Mar 2019 19:28    TPro  6.8  /  Alb  4.0  /  TBili  0.4  /  DBili  x   /  AST  20  /  ALT  16  /  AlkPhos  31<L>  03-13    PT/INR - ( 14 Mar 2019 06:37 )   PT: 14.3 sec;   INR: 1.25 ratio    PTT - ( 13 Mar 2019 19:28 )  PTT:55.3 sec      RADIOLOGY & ADDITIONAL TESTS:    Imaging Personally Reviewed:   CT head: No significant interval change from 3/13/2019. Similar-appearing right frontotemporal mixed density subdural hemorrhage measuring 1.0 cm in greatest depth. Similar thin acute subdural hemorrhage in the interhemispheric fissure and along the tentorial leaves.No significant midline shift. No hydrocephalus.    Consultant(s) Notes Reviewed: Cardiology, neurosurgery     Care Discussed with Consultants/Other Providers: neurosurgery re anticoagulation

## 2019-03-14 NOTE — PROGRESS NOTE ADULT - ASSESSMENT
HPI:  Patient is a 48 year old male with hx of MI and mechanical aortic valve on coumadin with persistent headaches after a fall one month prior.  MRI was done as outpatient showing subdural hematoma.  Presented to ED afterwards and admitted to the neurosurgery service for further management.      PLAN:  -keppra for seizure prophylaxis  -oxycodone for pain control  -decadron 4mg BID  -hold heparin/lovenox/coumadin in setting of subdural hematoma, SCDs only for DVT prophylaxis  -senna and colace for bowel regimen  -hospitalist following  -cardiology called due to mechanical valve and temporarily off anticoagulation  -regular diet  -out of bed with assistance  -incentive spirometer for lung expansion  -am labs                Assessment:  Please Check When Present   []  GCS  E   V  M     Heart Failure: []Acute, [] acute on chronic , []chronic  Heart Failure:  [] Diastolic (HFpEF), [] Systolic (HFrEF), []Combined (HFpEF and HFrEF), [] RHF, [] Pulm HTN, [] Other    [] SAY, [] ATN, [] AIN, [] other  [] CKD1, [] CKD2, [] CKD 3, [] CKD 4, [] CKD 5, []ESRD    Encephalopathy: [] Metabolic, [] Hepatic, [] toxic, [] Neurological, [] Other    Abnormal Nurtitional Status: [] malnurtition (see nutrition note), [ ]underweight: BMI < 19, [] morbid obesity: BMI >40, [] Cachexia    [] Sepsis  [] hypovolemic shock,[] cardiogenic shock, [] hemorrhagic shock, [] neuogenic shock  [] Acute Respiratory Failure  []Cerebral edema, [] Brain compression/ herniation,   [] Functional quadriplegia  [] Acute blood loss anemia

## 2019-03-14 NOTE — CHART NOTE - NSCHARTNOTEFT_GEN_A_CORE
CAPRINI SCORE [CLOT] Score on Admission for     AGE RELATED RISK FACTORS                                                       MOBILITY RELATED FACTORS  [x] Age 41-60 years                                            (1 Point)                  [ ] Bed rest                                                        (1 Point)  [ ] Age: 61-74 years                                           (2 Points)                 [ ] Plaster cast                                                   (2 Points)  [ ] Age= 75 years                                              (3 Points)                 [ ] Bed bound for more than 72 hours                 (2 Points)    DISEASE RELATED RISK FACTORS                                               GENDER SPECIFIC FACTORS  [ ] Edema in the lower extremities                       (1 Point)                  [ ] Pregnancy                                                     (1 Point)  [ ] Varicose veins                                               (1 Point)                  [ ] Post-partum < 6 weeks                                   (1 Point)             [ ] BMI > 25 Kg/m2                                            (1 Point)                  [ ] Hormonal therapy  or oral contraception          (1 Point)                 [ ] Sepsis (in the previous month)                        (1 Point)                  [ ] History of pregnancy complications                 (1 point)  [ ] Pneumonia or serious lung disease                                               [ ] Unexplained or recurrent                     (1 Point)           (in the previous month)                               (1 Point)  [ ] Abnormal pulmonary function test                     (1 Point)                 SURGERY RELATED RISK FACTORS (include planned surgeries)  [ ] Acute myocardial infarction                              (1 Point)                 [ ]  Section                                             (1 Point)  [ ] Congestive heart failure (in the previous month)  (1 Point)         [ ] Minor surgery                                                  (1 Point)   [ ] Inflammatory bowel disease                             (1 Point)                 [ ] Arthroscopic surgery                                        (2 Points)  [ ] Central venous access                                      (2 Points)                [ ] General surgery lasting more than 45 minutes   (2 Points)       [ ] Stroke (in the previous month)                          (5 Points)               [ ] Elective arthroplasty                                         (5 Points)            [ ] current or past malignancy                              (2 Points)                                                                                                       HEMATOLOGY RELATED FACTORS                                                 TRAUMA RELATED RISK FACTORS  [ ] Prior episodes of VTE                                     (3 Points)                [ ] Fracture of the hip, pelvis, or leg                       (5 Points)  [ ] Positive family history for VTE                         (3 Points)                 [ ] Acute spinal cord injury (in the previous month)  (5 Points)  [ ] Prothrombin 16364 A                                     (3 Points)                 [ ] Paralysis  (less than 1 month)                             (5 Points)  [ ] Factor V Leiden                                             (3 Points)                  [ ] Multiple Trauma within 1 month                        (5 Points)  [ ] Lupus anticoagulants                                     (3 Points)                                                           [ ] Anticardiolipin antibodies                               (3 Points)                                                       [ ] High homocysteine in the blood                      (3 Points)                                             [ ] Other congenital or acquired thrombophilia      (3 Points)                                                [ ] Heparin induced thrombocytopenia                  (3 Points)                                          Total Score [    1    ]    Risk:  Very low 0   Low 1 to 2   Moderate 3 to 4   High =5       VTE Prophylasix Recommednations:  [x] mechanical pneumatic compression devices                                      [ ] contraindicated: _____________________  [x] chemo prophylasix                                                                                   [ ] contraindicated _____________________    **** HIGH LIKELIHOOD DVT PRESENT ON ADMISSION  [ ] (please order LE dopplers within 24 hours of admission)

## 2019-03-14 NOTE — CONSULT NOTE ADULT - SUBJECTIVE AND OBJECTIVE BOX
CHIEF COMPLAINT:Patient is a 48y old  Male who presents with a chief complaint of subdural hematoma (14 Mar 2019 13:47)      HISTORY OF PRESENT ILLNESS:    48 year old male with history of aflutter s/p ablation , Bicuspid AV s/p mech AVR admitted with SDH  No chest pain, dyspnea, palpitation, or dizziness.     PAST MEDICAL & SURGICAL HISTORY:  Atrial flutter  Aortic aneurysm  HTN (hypertension)  Arrhythmia  S/P AVR  Gastric bypass status for obesity  H/O nasal septoplasty          MEDICATIONS:  metoprolol tartrate 50 milliGRAM(s) Oral two times a day        acetaminophen   Tablet .. 650 milliGRAM(s) Oral every 6 hours PRN  cyclobenzaprine 10 milliGRAM(s) Oral three times a day PRN  levETIRAcetam 500 milliGRAM(s) Oral every 12 hours  ondansetron   Disintegrating Tablet 4 milliGRAM(s) Oral every 6 hours PRN  oxyCODONE    IR 5 milliGRAM(s) Oral every 4 hours PRN  oxyCODONE    IR 10 milliGRAM(s) Oral every 4 hours PRN    docusate sodium 100 milliGRAM(s) Oral three times a day  pantoprazole    Tablet 40 milliGRAM(s) Oral before breakfast  senna 2 Tablet(s) Oral at bedtime PRN    dexamethasone     Tablet 4 milliGRAM(s) Oral two times a day        FAMILY HISTORY:  Family history of stroke due to aneurysm  FH: MI (myocardial infarction)      Non-contributory    SOCIAL HISTORY:    No tobacco, drugs or etoh    Allergies    No Known Allergies    Intolerances    	    REVIEW OF SYSTEMS:  as above  The rest of the 14 points ROS reviewed and except above they are unremarkable.        PHYSICAL EXAM:  T(C): 36.7 (03-14-19 @ 16:19), Max: 37.1 (03-14-19 @ 12:35)  HR: 64 (03-14-19 @ 16:19) (54 - 73)  BP: 129/74 (03-14-19 @ 16:19) (118/76 - 155/81)  RR: 18 (03-14-19 @ 16:19) (17 - 19)  SpO2: 94% (03-14-19 @ 16:19) (94% - 98%)  Wt(kg): --  I&O's Summary    13 Mar 2019 07:01  -  14 Mar 2019 07:00  --------------------------------------------------------  IN: 280 mL / OUT: 450 mL / NET: -170 mL    14 Mar 2019 07:01  -  14 Mar 2019 17:06  --------------------------------------------------------  IN: 720 mL / OUT: 0 mL / NET: 720 mL        Appearance: Normal	  HEENT:   no gross abnormality   Cardiovascular: Normal S1 S2,    Murmur:   Neck: JVP normal  Respiratory: Lungs clear   Gastrointestinal:  Soft, Non-tender  Skin: normal   Neuro: No gross deficits.   Psychiatry:  Mood & affect flat  Ext: No edema    LABS/DATA:    TELEMETRY: 	    ECG:  	   	  CARDIAC MARKERS:                                      17.4   11.0  )-----------( 187      ( 13 Mar 2019 23:42 )             48.8     03-13    136  |  99  |  14  ----------------------------<  99  3.9   |  24  |  0.83    Ca    10.0      13 Mar 2019 19:28    TPro  6.8  /  Alb  4.0  /  TBili  0.4  /  DBili  x   /  AST  20  /  ALT  16  /  AlkPhos  31<L>  03-13    proBNP:   Lipid Profile:   HgA1c:   TSH:

## 2019-03-15 LAB
ANION GAP SERPL CALC-SCNC: 12 MMOL/L — SIGNIFICANT CHANGE UP (ref 5–17)
APTT BLD: 29 SEC — SIGNIFICANT CHANGE UP (ref 27.5–36.3)
BUN SERPL-MCNC: 12 MG/DL — SIGNIFICANT CHANGE UP (ref 7–23)
CALCIUM SERPL-MCNC: 10.3 MG/DL — SIGNIFICANT CHANGE UP (ref 8.4–10.5)
CHLORIDE SERPL-SCNC: 102 MMOL/L — SIGNIFICANT CHANGE UP (ref 96–108)
CO2 SERPL-SCNC: 24 MMOL/L — SIGNIFICANT CHANGE UP (ref 22–31)
CREAT SERPL-MCNC: 0.82 MG/DL — SIGNIFICANT CHANGE UP (ref 0.5–1.3)
GLUCOSE SERPL-MCNC: 114 MG/DL — HIGH (ref 70–99)
HCT VFR BLD CALC: 50.1 % — HIGH (ref 39–50)
HGB BLD-MCNC: 17 G/DL — SIGNIFICANT CHANGE UP (ref 13–17)
INR BLD: 1.18 RATIO — HIGH (ref 0.88–1.16)
MCHC RBC-ENTMCNC: 32.4 PG — SIGNIFICANT CHANGE UP (ref 27–34)
MCHC RBC-ENTMCNC: 34 GM/DL — SIGNIFICANT CHANGE UP (ref 32–36)
MCV RBC AUTO: 95.4 FL — SIGNIFICANT CHANGE UP (ref 80–100)
PLATELET # BLD AUTO: 187 K/UL — SIGNIFICANT CHANGE UP (ref 150–400)
POTASSIUM SERPL-MCNC: 4.2 MMOL/L — SIGNIFICANT CHANGE UP (ref 3.5–5.3)
POTASSIUM SERPL-SCNC: 4.2 MMOL/L — SIGNIFICANT CHANGE UP (ref 3.5–5.3)
PROTHROM AB SERPL-ACNC: 13.6 SEC — HIGH (ref 10–12.9)
RBC # BLD: 5.25 M/UL — SIGNIFICANT CHANGE UP (ref 4.2–5.8)
RBC # FLD: 13.3 % — SIGNIFICANT CHANGE UP (ref 10.3–14.5)
SODIUM SERPL-SCNC: 138 MMOL/L — SIGNIFICANT CHANGE UP (ref 135–145)
WBC # BLD: 13.7 K/UL — HIGH (ref 3.8–10.5)
WBC # FLD AUTO: 13.7 K/UL — HIGH (ref 3.8–10.5)

## 2019-03-15 PROCEDURE — 99232 SBSQ HOSP IP/OBS MODERATE 35: CPT

## 2019-03-15 PROCEDURE — 99233 SBSQ HOSP IP/OBS HIGH 50: CPT

## 2019-03-15 RX ADMIN — Medication 650 MILLIGRAM(S): at 14:00

## 2019-03-15 RX ADMIN — Medication 50 MILLIGRAM(S): at 05:33

## 2019-03-15 RX ADMIN — OXYCODONE HYDROCHLORIDE 10 MILLIGRAM(S): 5 TABLET ORAL at 00:17

## 2019-03-15 RX ADMIN — Medication 4 MILLIGRAM(S): at 17:41

## 2019-03-15 RX ADMIN — Medication 50 MILLIGRAM(S): at 17:41

## 2019-03-15 RX ADMIN — LEVETIRACETAM 500 MILLIGRAM(S): 250 TABLET, FILM COATED ORAL at 17:41

## 2019-03-15 RX ADMIN — Medication 100 MILLIGRAM(S): at 13:53

## 2019-03-15 RX ADMIN — Medication 100 MILLIGRAM(S): at 05:32

## 2019-03-15 RX ADMIN — Medication 4 MILLIGRAM(S): at 05:31

## 2019-03-15 RX ADMIN — LEVETIRACETAM 500 MILLIGRAM(S): 250 TABLET, FILM COATED ORAL at 05:32

## 2019-03-15 RX ADMIN — Medication 100 MILLIGRAM(S): at 21:54

## 2019-03-15 RX ADMIN — PANTOPRAZOLE SODIUM 40 MILLIGRAM(S): 20 TABLET, DELAYED RELEASE ORAL at 05:35

## 2019-03-15 RX ADMIN — Medication 650 MILLIGRAM(S): at 13:53

## 2019-03-15 NOTE — PROGRESS NOTE ADULT - SUBJECTIVE AND OBJECTIVE BOX
Subjective: Patient seen and examined. No new events except as noted.     SUBJECTIVE/ROS:  No chest pain, dyspnea, palpitation, or dizziness.       MEDICATIONS:  MEDICATIONS  (STANDING):  dexamethasone     Tablet 4 milliGRAM(s) Oral two times a day  docusate sodium 100 milliGRAM(s) Oral three times a day  levETIRAcetam 500 milliGRAM(s) Oral every 12 hours  metoprolol tartrate 50 milliGRAM(s) Oral two times a day  pantoprazole    Tablet 40 milliGRAM(s) Oral before breakfast      PHYSICAL EXAM:  T(C): 36.4 (03-15-19 @ 12:13), Max: 37.2 (03-15-19 @ 08:30)  HR: 54 (03-15-19 @ 12:49) (50 - 96)  BP: 134/77 (03-15-19 @ 12:49) (113/72 - 144/82)  RR: 16 (03-15-19 @ 12:13) (16 - 18)  SpO2: 96% (03-15-19 @ 12:13) (95% - 96%)  Wt(kg): --  I&O's Summary    14 Mar 2019 07:01  -  15 Mar 2019 07:00  --------------------------------------------------------  IN: 840 mL / OUT: 0 mL / NET: 840 mL    15 Mar 2019 07:01  -  15 Mar 2019 16:23  --------------------------------------------------------  IN: 1480 mL / OUT: 0 mL / NET: 1480 mL            JVP: Normal  Neck: supple  Lung: clear   CV: S1 S2 , Murmur:  Abd: soft  Ext: No edema  neuro: Awake / alert  Psych: flat affect  Skin: normal``    LABS/DATA:    CARDIAC MARKERS:                                17.0   13.7  )-----------( 187      ( 15 Mar 2019 05:56 )             50.1     03-15    138  |  102  |  12  ----------------------------<  114<H>  4.2   |  24  |  0.82    Ca    10.3      15 Mar 2019 05:56    TPro  6.8  /  Alb  4.0  /  TBili  0.4  /  DBili  x   /  AST  20  /  ALT  16  /  AlkPhos  31<L>  03-13    proBNP:   Lipid Profile:   HgA1c:   TSH:     TELE:  EKG:

## 2019-03-15 NOTE — PHYSICAL THERAPY INITIAL EVALUATION ADULT - PRECAUTIONS/LIMITATIONS, REHAB EVAL
no known precautions/limitations Abdomen soft, nontender, nondistended, bowel sounds present in all 4 quadrants.

## 2019-03-15 NOTE — PROGRESS NOTE ADULT - ASSESSMENT
aflutter   s/p ablation     mechanical avr  hemodynamically stable   off a/c given SDH   fu with neurosurgery   resume a/c once deemed safe by nsx   echo reviewed , avr with normal hemodynamics, normal LV function     SDH  asymptomatic  plan as above

## 2019-03-15 NOTE — PROGRESS NOTE ADULT - SUBJECTIVE AND OBJECTIVE BOX
SUBJECTIVE: Seen and examined at bedside. No acute overnight events. No complaints this morning. Denies headache, blurry vision, chest pain or n/v. Cardiology consulted recommendation appreciated resume anticoagulation when safe.  Hospitalist following recommendation appreciated in resuming anticoagulation.     Vital Signs Last 24 Hrs  T(C): 36.4 (03-15-19 @ 12:13), Max: 37.2 (03-15-19 @ 08:30)  T(F): 97.5 (03-15-19 @ 12:13), Max: 98.9 (03-15-19 @ 08:30)  HR: 55 (03-15-19 @ 12:13) (50 - 96)  BP: 113/72 (03-15-19 @ 12:13) (113/72 - 144/82)  BP(mean): --  RR: 16 (03-15-19 @ 12:13) (16 - 18)  SpO2: 96% (03-15-19 @ 12:13) (94% - 96%)    PHYSICAL EXAM:    Constitutional: No Acute Distress     Neurological: Awake, alert, oriented to person, place and time, speech clear and fluent, face equal, tongue midline, extraocular movements intact, no nystagmus briskly following commands, no drift, moves all extremities with 5/5 strength, sensation intact to light touch throughout,      Pulmonary: Clear to Auscultation, No rales, No rhonchi, No wheezes     Cardiovascular: + S1, S2,    Gastrointestinal: Soft, Non-tender, Non-distended, +bowel sounds x 4    Extremities: No calf tenderness bilaterally, no cyanosis, clubbing or edema          LABS:                          17.0   13.7  )-----------( 187      ( 15 Mar 2019 05:56 )             50.1    03-15    138  |  102  |  12  ----------------------------<  114<H>  4.2   |  24  |  0.82    Ca    10.3      15 Mar 2019 05:56    TPro  6.8  /  Alb  4.0  /  TBili  0.4  /  DBili  x   /  AST  20  /  ALT  16  /  AlkPhos  31<L>  03-13  PT/INR - ( 15 Mar 2019 05:56 )   PT: 13.6 sec;   INR: 1.18 ratio         PTT - ( 15 Mar 2019 05:56 )  PTT:29.0 sec    03-14 @ 07:01  -  03-15 @ 07:00  --------------------------------------------------------  IN: 840 mL / OUT: 0 mL / NET: 840 mL    03-15 @ 07:01  -  03-15 @ 12:45  --------------------------------------------------------  IN: 760 mL / OUT: 0 mL / NET: 760 mL        IMAGING:     TTE (3/14): 1. Mitral annular calcification, otherwise normal mitral  valve. Minimal mitral regurgitation.EF 60-65 %  CT Head (3/14/19)  No significant interval change from 3/13/2019.  Similar-appearing right frontotemporal mixed density subdural hemorrhage  measuring 1.0 cm in greatest depth. Similar thin acute subdural hemorrhage  in the interhemispheric fissure and along the tentorial leaves.  No significant midline shift. No hydrocephalus.     CT Head (3/13/19)  Subdural hematomas as described with 0.4 cm leftward midline shift and   partial effacement of basilar cisterns as described.       MEDICATIONS:  Antibiotics:    Neuro:  acetaminophen   Tablet .. 650 milliGRAM(s) Oral every 6 hours PRN Temp greater or equal to 38C (100.4F), Mild Pain (1 - 3)  cyclobenzaprine 10 milliGRAM(s) Oral three times a day PRN Muscle Spasm  levETIRAcetam 500 milliGRAM(s) Oral every 12 hours  ondansetron   Disintegrating Tablet 4 milliGRAM(s) Oral every 6 hours PRN Nausea  oxyCODONE    IR 5 milliGRAM(s) Oral every 4 hours PRN Moderate Pain (4 - 6)  oxyCODONE    IR 10 milliGRAM(s) Oral every 4 hours PRN Severe Pain (7 - 10)    Cardiac:  metoprolol tartrate 50 milliGRAM(s) Oral two times a day    Pulm:    GI/:  docusate sodium 100 milliGRAM(s) Oral three times a day  pantoprazole    Tablet 40 milliGRAM(s) Oral before breakfast  senna 2 Tablet(s) Oral at bedtime PRN Constipation    Other:   dexamethasone     Tablet 4 milliGRAM(s) Oral two times a day        DIET: Regular Diet

## 2019-03-15 NOTE — PROGRESS NOTE ADULT - SUBJECTIVE AND OBJECTIVE BOX
Nabil Mcguire MD  ([facility] Hospitalist)  Pager [phone]  (During off hours please page [phone])      Patient is a 48y old  Male who presents with a chief complaint of SDH (15 Mar 2019 12:44)      SUBJECTIVE / OVERNIGHT EVENTS: No events overnight. No new complaints.     MEDICATIONS  (STANDING):  dexamethasone     Tablet 4 milliGRAM(s) Oral two times a day  docusate sodium 100 milliGRAM(s) Oral three times a day  levETIRAcetam 500 milliGRAM(s) Oral every 12 hours  metoprolol tartrate 50 milliGRAM(s) Oral two times a day  pantoprazole    Tablet 40 milliGRAM(s) Oral before breakfast    MEDICATIONS  (PRN):  acetaminophen   Tablet .. 650 milliGRAM(s) Oral every 6 hours PRN Temp greater or equal to 38C (100.4F), Mild Pain (1 - 3)  cyclobenzaprine 10 milliGRAM(s) Oral three times a day PRN Muscle Spasm  ondansetron   Disintegrating Tablet 4 milliGRAM(s) Oral every 6 hours PRN Nausea  oxyCODONE    IR 5 milliGRAM(s) Oral every 4 hours PRN Moderate Pain (4 - 6)  oxyCODONE    IR 10 milliGRAM(s) Oral every 4 hours PRN Severe Pain (7 - 10)  senna 2 Tablet(s) Oral at bedtime PRN Constipation      Vital Signs Last 24 Hrs  T(C): 37 (15 Mar 2019 16:43), Max: 37.2 (15 Mar 2019 08:30)  T(F): 98.6 (15 Mar 2019 16:43), Max: 98.9 (15 Mar 2019 08:30)  HR: 59 (15 Mar 2019 16:43) (50 - 96)  BP: 124/79 (15 Mar 2019 16:43) (113/72 - 144/82)  BP(mean): --  RR: 18 (15 Mar 2019 16:43) (16 - 18)  SpO2: 94% (15 Mar 2019 16:43) (94% - 96%)      I&O's Summary    14 Mar 2019 07:01  -  15 Mar 2019 07:00  --------------------------------------------------------  IN: 840 mL / OUT: 0 mL / NET: 840 mL    15 Mar 2019 07:01  -  15 Mar 2019 16:58  --------------------------------------------------------  IN: 1480 mL / OUT: 0 mL / NET: 1480 mL        PHYSICAL EXAM:  GENERAL: NAD, well-developed  HEAD:  Atraumatic, Normocephalic  EYES: EOMI, conjunctiva and sclera clear  NECK: Supple, No JVD  CHEST/LUNG: Clear to auscultation bilaterally; No wheeze  HEART: Loud mechanical click over aortic area, Regular rate and rhythm; No murmurs.  ABDOMEN: Soft, Nontender, Nondistended; Bowel sounds present  EXTREMITIES:  No clubbing, cyanosis, or edema  PSYCH: Calm  NEUROLOGY: non-focal, AOx3  SKIN: No rashes or lesions        LABS:                        17.0   13.7  )-----------( 187      ( 15 Mar 2019 05:56 )             50.1     03-15    138  |  102  |  12  ----------------------------<  114<H>  4.2   |  24  |  0.82    Ca    10.3      15 Mar 2019 05:56    TPro  6.8  /  Alb  4.0  /  TBili  0.4  /  DBili  x   /  AST  20  /  ALT  16  /  AlkPhos  31<L>  03-13    PT/INR - ( 15 Mar 2019 05:56 )   PT: 13.6 sec;   INR: 1.18 ratio    PTT - ( 15 Mar 2019 05:56 )  PTT:29.0 sec      RADIOLOGY & ADDITIONAL TESTS:    Imaging Personally Reviewed:   TTE reviewed    Consultant(s) Notes Reviewed: Cardiology, neurosurgery    Care Discussed with Consultants/Other Providers: neurosurgery re SDH and when they feel it's safe to resume AC

## 2019-03-15 NOTE — PHYSICAL THERAPY INITIAL EVALUATION ADULT - PERTINENT HX OF CURRENT PROBLEM, REHAB EVAL
acute on chronic SDH acute on chronic SDH; c/o continued headaches after fall 1 month ago showing small R acute on chronic SDH stable on 4h CTH done here in ER

## 2019-03-15 NOTE — PROGRESS NOTE ADULT - ASSESSMENT
Patient is a 48 year old male with hx of MI and mechanical aortic valve on coumadin with persistent headaches after a fall one month prior.  MRI was done as outpatient showing subdural hematoma.  Presented to ED afterwards and admitted to the neurosurgery service for further management. No procedure was done.    PLAN:  - Con't to HOLD heparin/lovenox/coumadin in setting of subdural hematoma.  - f/u cardiology rec's when to restart anticoagulation  due to mechanical valve   - Hospitalist following. rec's appreciated   - Con't keppra for seizure prophylaxis  - Con't oxycodone for pain control  - Con't decadron 4mg BID for headache   - Con't DVT ppx  SCDs   - Con't GI ppc senna and colace for bowel regimen. Regular diet      Case discuss with attending Dr. Calles  [id number] Patient is a 48 year old male with hx of MI and mechanical aortic valve on coumadin with persistent headaches after a fall one month prior.  MRI was done as outpatient showing subdural hematoma.  Presented to ED afterwards and admitted to the neurosurgery service for further management. No procedure was done.    PLAN:  - Con't to HOLD anticoagulation in setting of subdural hematoma.  - f/u cardiology rec's when to restart anticoagulation  due to mechanical valve   - Hospitalist following. rec's appreciated   - Con't keppra for seizure prophylaxis  - Con't oxycodone for pain control  - Con't decadron 4mg BID for headache   - Con't DVT ppx  SCDs   - Con't GI ppc senna and colace for bowel regimen. Regular diet      Case discuss with attending Dr. Calles  [phone]

## 2019-03-16 PROCEDURE — 99232 SBSQ HOSP IP/OBS MODERATE 35: CPT

## 2019-03-16 PROCEDURE — 99232 SBSQ HOSP IP/OBS MODERATE 35: CPT | Mod: GC

## 2019-03-16 RX ORDER — SENNA PLUS 8.6 MG/1
2 TABLET ORAL AT BEDTIME
Qty: 0 | Refills: 0 | Status: DISCONTINUED | OUTPATIENT
Start: 2019-03-16 | End: 2019-03-26

## 2019-03-16 RX ADMIN — Medication 100 MILLIGRAM(S): at 05:56

## 2019-03-16 RX ADMIN — PANTOPRAZOLE SODIUM 40 MILLIGRAM(S): 20 TABLET, DELAYED RELEASE ORAL at 05:55

## 2019-03-16 RX ADMIN — Medication 650 MILLIGRAM(S): at 18:05

## 2019-03-16 RX ADMIN — Medication 4 MILLIGRAM(S): at 18:05

## 2019-03-16 RX ADMIN — SENNA PLUS 2 TABLET(S): 8.6 TABLET ORAL at 21:23

## 2019-03-16 RX ADMIN — LEVETIRACETAM 500 MILLIGRAM(S): 250 TABLET, FILM COATED ORAL at 05:56

## 2019-03-16 RX ADMIN — Medication 100 MILLIGRAM(S): at 21:23

## 2019-03-16 RX ADMIN — LEVETIRACETAM 500 MILLIGRAM(S): 250 TABLET, FILM COATED ORAL at 18:05

## 2019-03-16 RX ADMIN — Medication 650 MILLIGRAM(S): at 18:31

## 2019-03-16 RX ADMIN — Medication 4 MILLIGRAM(S): at 05:56

## 2019-03-16 NOTE — PROGRESS NOTE ADULT - SUBJECTIVE AND OBJECTIVE BOX
SUBJECTIVE: Seen and examined at bedside. No acute overnight events. No complaints this morning. Denies headache, blurry vision, chest pain or n/v.     Vital Signs Last 24 Hrs  T(C): 36.8 (16 Mar 2019 13:59), Max: 36.9 (15 Mar 2019 20:14)  T(F): 98.2 (16 Mar 2019 13:59), Max: 98.4 (15 Mar 2019 20:14)  HR: 60 (16 Mar 2019 13:59) (53 - 60)  BP: 139/82 (16 Mar 2019 13:59) (125/69 - 139/82)  BP(mean): --  RR: 19 (16 Mar 2019 13:59) (18 - 19)  SpO2: 97% (16 Mar 2019 13:59) (94% - 97%)    PHYSICAL EXAM:    Constitutional: No Acute Distress     Neurological: Awake, alert, oriented to person, place and time, speech clear and fluent, face equal, tongue midline, extraocular movements intact, no nystagmus briskly following commands, no drift, moves all extremities with 5/5 strength, sensation intact to light touch throughout, ambulating halls independently    Pulmonary: Clear to Auscultation, No rales, No rhonchi, No wheezes     Cardiovascular: + S1, S2,    Gastrointestinal: Soft, Non-tender, Non-distended, +bowel sounds x 4    Extremities: No calf tenderness bilaterally, no cyanosis, clubbing or edema      LABS:                          17.0   13.7  )-----------( 187      ( 15 Mar 2019 05:56 )             50.1    03-15    138  |  102  |  12  ----------------------------<  114<H>  4.2   |  24  |  0.82    Ca    10.3      15 Mar 2019 05:56    TPro  6.8  /  Alb  4.0  /  TBili  0.4  /  DBili  x   /  AST  20  /  ALT  16  /  AlkPhos  31<L>  03-13  PT/INR - ( 15 Mar 2019 05:56 )   PT: 13.6 sec;   INR: 1.18 ratio         PTT - ( 15 Mar 2019 05:56 )  PTT:29.0 sec    03-14 @ 07:01  -  03-15 @ 07:00  --------------------------------------------------------  IN: 840 mL / OUT: 0 mL / NET: 840 mL    03-15 @ 07:01  -  03-15 @ 12:45  --------------------------------------------------------  IN: 760 mL / OUT: 0 mL / NET: 760 mL        IMAGING:     TTE (3/14): 1. Mitral annular calcification, otherwise normal mitral  valve. Minimal mitral regurgitation.EF 60-65 %  CT Head (3/14/19)  No significant interval change from 3/13/2019.  Similar-appearing right frontotemporal mixed density subdural hemorrhage  measuring 1.0 cm in greatest depth. Similar thin acute subdural hemorrhage  in the interhemispheric fissure and along the tentorial leaves.  No significant midline shift. No hydrocephalus.     CT Head (3/13/19)  Subdural hematomas as described with 0.4 cm leftward midline shift and   partial effacement of basilar cisterns as described.       MEDICATIONS  (STANDING):  dexamethasone     Tablet 4 milliGRAM(s) Oral two times a day  docusate sodium 100 milliGRAM(s) Oral three times a day  levETIRAcetam 500 milliGRAM(s) Oral every 12 hours  metoprolol tartrate 50 milliGRAM(s) Oral two times a day  pantoprazole    Tablet 40 milliGRAM(s) Oral before breakfast  senna 2 Tablet(s) Oral at bedtime    MEDICATIONS  (PRN):  acetaminophen   Tablet .. 650 milliGRAM(s) Oral every 6 hours PRN Temp greater or equal to 38C (100.4F), Mild Pain (1 - 3)  cyclobenzaprine 10 milliGRAM(s) Oral three times a day PRN Muscle Spasm  ondansetron   Disintegrating Tablet 4 milliGRAM(s) Oral every 6 hours PRN Nausea  oxyCODONE    IR 5 milliGRAM(s) Oral every 4 hours PRN Moderate Pain (4 - 6)  oxyCODONE    IR 10 milliGRAM(s) Oral every 4 hours PRN Severe Pain (7 - 10)

## 2019-03-16 NOTE — PROGRESS NOTE ADULT - ASSESSMENT
48 year old male with PMH aflutter s/p ablation (2017), aortic aneurysm s/p Bentall procedure with mechanical aortic valve (2013) on coumadin, gastric bypass presents with headaches after outpatient MRI showing small right acute on chronic SDH. Now awaiting decision on when to resume coumadin.

## 2019-03-16 NOTE — PROGRESS NOTE ADULT - SUBJECTIVE AND OBJECTIVE BOX
Patient denies headache, shortness of breath. No new complaints today.    GENERAL: No fevers, no chills.  EYES: No blurry vision,  No photophobia  ENT: No sore throat.  No dysphagia  Cardiovascular: No chest pain, palpitations, orthopnea  Pulmonary: No cough, no wheezing. No shortness of breath  Gastrointestinal: No abdominal pain, no diarrhea, no constipation.    : No dysuria.  No hematuria  Musculoskeletal: No weakness.  No myalgias.  Dermatology:  No rashes.  Neuro: No Headache.  No vertigo.  No dizziness.  Psych: No anxiety, no depression.  Denies suicidal thoughts.    Vital Signs Last 24 Hrs  T(C): 36.8 (16 Mar 2019 13:59), Max: 37 (15 Mar 2019 16:43)  T(F): 98.2 (16 Mar 2019 13:59), Max: 98.6 (15 Mar 2019 16:43)  HR: 60 (16 Mar 2019 13:59) (53 - 60)  BP: 139/82 (16 Mar 2019 13:59) (124/79 - 139/82)  BP(mean): --  RR: 19 (16 Mar 2019 13:59) (18 - 19)  SpO2: 97% (16 Mar 2019 13:59) (94% - 97%)    GENERAL: NAD, well-developed  HEAD:  Atraumatic, Normocephalic  EYES: EOMI, PERRLA, conjunctiva and sclera clear  ENT: Pharynx not erythematous  PULMONARY: Clear to auscultation bilaterally; No wheeze  CARDIOVASCULAR: Regular rate and rhythm; No murmurs, rubs, or gallops  ABDOMEN: Soft, Nontender, Nondistended; Bowel sounds present  EXTREMITIES:  2+ Peripheral Pulses, No clubbing, cyanosis, or edema  MUSCULOSKELETAL: No calf tenderness  PSYCH: AAOx3, normal affect  SKIN: warm and dry, No rashes or lesions

## 2019-03-16 NOTE — PROGRESS NOTE ADULT - ASSESSMENT
aflutter   s/p ablation     mechanical avr  hemodynamically stable   off a/c given SDH   fu with neurosurgery   resume a/c once deemed safe by nsx   would not prolong a/c if possible more than another week unless significant high bleed risk   echo reviewed , avr with normal hemodynamics, normal LV function

## 2019-03-16 NOTE — PROGRESS NOTE ADULT - SUBJECTIVE AND OBJECTIVE BOX
Subjective: Patient seen and examined. No new events except as noted.     SUBJECTIVE/ROS:  No chest pain, dyspnea, palpitation, or dizziness.       MEDICATIONS:  MEDICATIONS  (STANDING):  dexamethasone     Tablet 4 milliGRAM(s) Oral two times a day  docusate sodium 100 milliGRAM(s) Oral three times a day  levETIRAcetam 500 milliGRAM(s) Oral every 12 hours  metoprolol tartrate 50 milliGRAM(s) Oral two times a day  pantoprazole    Tablet 40 milliGRAM(s) Oral before breakfast  senna 2 Tablet(s) Oral at bedtime      PHYSICAL EXAM:  T(C): 36.6 (03-16-19 @ 07:10), Max: 37 (03-15-19 @ 16:43)  HR: 53 (03-16-19 @ 07:10) (53 - 59)  BP: 125/69 (03-16-19 @ 07:10) (124/79 - 127/75)  RR: 18 (03-16-19 @ 07:10) (18 - 18)  SpO2: 95% (03-16-19 @ 07:10) (94% - 95%)  Wt(kg): --  I&O's Summary    15 Mar 2019 07:01  -  16 Mar 2019 07:00  --------------------------------------------------------  IN: 1600 mL / OUT: 0 mL / NET: 1600 mL            JVP: Normal  Neck: supple  Lung: clear   CV: S1 S2 , Murmur:  Abd: soft  Ext: No edema  neuro: Awake / alert  Psych: flat affect  Skin: normal``    LABS/DATA:    CARDIAC MARKERS:                                17.0   13.7  )-----------( 187      ( 15 Mar 2019 05:56 )             50.1     03-15    138  |  102  |  12  ----------------------------<  114<H>  4.2   |  24  |  0.82    Ca    10.3      15 Mar 2019 05:56      proBNP:   Lipid Profile:   HgA1c:   TSH:     TELE:  EKG:

## 2019-03-16 NOTE — PROGRESS NOTE ADULT - ASSESSMENT
Patient is a 48 year old male with hx of MI and mechanical aortic valve on coumadin with persistent headaches after a fall one month prior.  MRI was done as outpatient showing subdural hematoma.  Presented to ED afterwards and admitted to the neurosurgery service for further management. No surgical intervention to date.     PLAN:  - Con't to HOLD anticoagulation in setting of subdural hematoma.  - f/u cardiology rec's when to restart anticoagulation due to mechanical valve   - Hospitalist medicine following. rec's appreciated   - Con't keppra for seizure prophylaxis  - Con't oxycodone for pain control  - Con't decadron 4mg BID for headache   - Con't DVT ppx  SCDs   - Con't GI ppx senna and colace for bowel regimen.     will discuss with attending Dr. Calles  [id number]

## 2019-03-17 LAB
ANION GAP SERPL CALC-SCNC: 11 MMOL/L — SIGNIFICANT CHANGE UP (ref 5–17)
APTT BLD: 26.9 SEC — LOW (ref 27.5–36.3)
BUN SERPL-MCNC: 16 MG/DL — SIGNIFICANT CHANGE UP (ref 7–23)
CALCIUM SERPL-MCNC: 10 MG/DL — SIGNIFICANT CHANGE UP (ref 8.4–10.5)
CHLORIDE SERPL-SCNC: 102 MMOL/L — SIGNIFICANT CHANGE UP (ref 96–108)
CO2 SERPL-SCNC: 24 MMOL/L — SIGNIFICANT CHANGE UP (ref 22–31)
CREAT SERPL-MCNC: 0.79 MG/DL — SIGNIFICANT CHANGE UP (ref 0.5–1.3)
GLUCOSE SERPL-MCNC: 108 MG/DL — HIGH (ref 70–99)
HCT VFR BLD CALC: 46.5 % — SIGNIFICANT CHANGE UP (ref 39–50)
HGB BLD-MCNC: 16 G/DL — SIGNIFICANT CHANGE UP (ref 13–17)
INR BLD: 1.08 RATIO — SIGNIFICANT CHANGE UP (ref 0.88–1.16)
MCHC RBC-ENTMCNC: 31.6 PG — SIGNIFICANT CHANGE UP (ref 27–34)
MCHC RBC-ENTMCNC: 34.4 GM/DL — SIGNIFICANT CHANGE UP (ref 32–36)
MCV RBC AUTO: 91.9 FL — SIGNIFICANT CHANGE UP (ref 80–100)
PLATELET # BLD AUTO: 198 K/UL — SIGNIFICANT CHANGE UP (ref 150–400)
POTASSIUM SERPL-MCNC: 4.2 MMOL/L — SIGNIFICANT CHANGE UP (ref 3.5–5.3)
POTASSIUM SERPL-SCNC: 4.2 MMOL/L — SIGNIFICANT CHANGE UP (ref 3.5–5.3)
PROTHROM AB SERPL-ACNC: 12.4 SEC — SIGNIFICANT CHANGE UP (ref 10–13.1)
RBC # BLD: 5.06 M/UL — SIGNIFICANT CHANGE UP (ref 4.2–5.8)
RBC # FLD: 13 % — SIGNIFICANT CHANGE UP (ref 10.3–14.5)
SODIUM SERPL-SCNC: 137 MMOL/L — SIGNIFICANT CHANGE UP (ref 135–145)
WBC # BLD: 12.65 K/UL — HIGH (ref 3.8–10.5)
WBC # FLD AUTO: 12.65 K/UL — HIGH (ref 3.8–10.5)

## 2019-03-17 PROCEDURE — 99232 SBSQ HOSP IP/OBS MODERATE 35: CPT

## 2019-03-17 PROCEDURE — 99231 SBSQ HOSP IP/OBS SF/LOW 25: CPT

## 2019-03-17 RX ADMIN — Medication 50 MILLIGRAM(S): at 17:37

## 2019-03-17 RX ADMIN — LEVETIRACETAM 500 MILLIGRAM(S): 250 TABLET, FILM COATED ORAL at 17:37

## 2019-03-17 RX ADMIN — OXYCODONE HYDROCHLORIDE 10 MILLIGRAM(S): 5 TABLET ORAL at 20:14

## 2019-03-17 RX ADMIN — Medication 4 MILLIGRAM(S): at 17:37

## 2019-03-17 RX ADMIN — CYCLOBENZAPRINE HYDROCHLORIDE 10 MILLIGRAM(S): 10 TABLET, FILM COATED ORAL at 19:31

## 2019-03-17 RX ADMIN — LEVETIRACETAM 500 MILLIGRAM(S): 250 TABLET, FILM COATED ORAL at 05:53

## 2019-03-17 RX ADMIN — OXYCODONE HYDROCHLORIDE 10 MILLIGRAM(S): 5 TABLET ORAL at 21:15

## 2019-03-17 RX ADMIN — Medication 4 MILLIGRAM(S): at 05:53

## 2019-03-17 RX ADMIN — CYCLOBENZAPRINE HYDROCHLORIDE 10 MILLIGRAM(S): 10 TABLET, FILM COATED ORAL at 13:29

## 2019-03-17 RX ADMIN — PANTOPRAZOLE SODIUM 40 MILLIGRAM(S): 20 TABLET, DELAYED RELEASE ORAL at 05:53

## 2019-03-17 RX ADMIN — Medication 100 MILLIGRAM(S): at 05:53

## 2019-03-17 NOTE — PROGRESS NOTE PEDS - NSICDXPROBLEM_GEN_ALL_CORE_FT
PROBLEM DIAGNOSES  Problem: History of atrial flutter  Assessment and Plan: - s/p ablation   - continue amiodarone    Problem: History of mechanical aortic valve replacement  Assessment and Plan: s/p INR reversal. Given mechanical valve, the patient is at elevated risk for stroke without AC.    Once the patient is deemed safe to begin anticoagulation per neurosurgery, would start Heparin gtt (without bolus) and monitor neurological status closely. Will need to be on therapeutic doses of coumadin prior to discharge from the hospital.  Per cardiology, would not wait more than another week to resume coumadin  - need neurosurgery okay to resume coumadin  - Cardiology following    Problem: SDH (subdural hematoma)  Assessment and Plan: - Management per neurosurgery.    - CT head with stable SDH.   - Before initiation of heparin gtt, consider repeating CT head

## 2019-03-17 NOTE — PROGRESS NOTE ADULT - SUBJECTIVE AND OBJECTIVE BOX
SUBJECTIVE: Seen and examined at bedside. No acute overnight events. No complaints this morning. Denies headache, blurry vision, chest pain or n/v.     Vital Signs Last 24 Hrs  T(C): 37 (17 Mar 2019 13:42), Max: 37 (17 Mar 2019 13:42)  T(F): 98.6 (17 Mar 2019 13:42), Max: 98.6 (17 Mar 2019 13:42)  HR: 70 (17 Mar 2019 13:42) (50 - 70)  BP: 134/80 (17 Mar 2019 13:42) (126/78 - 139/80)  BP(mean): --  RR: 18 (17 Mar 2019 13:42) (18 - 18)  SpO2: 95% (17 Mar 2019 13:42) (94% - 98%)    PHYSICAL EXAM:    Constitutional: No Acute Distress     Neurological: Awake, alert, oriented to person, place and time, speech clear and fluent, face equal, tongue midline, extraocular movements intact, no nystagmus briskly following commands, no drift, moves all extremities with 5/5 strength, sensation intact to light touch throughout, ambulating halls independently    Pulmonary: Clear to Auscultation, No rales, No rhonchi, No wheezes     Cardiovascular: + S1, S2,    Gastrointestinal: Soft, Non-tender, Non-distended, +bowel sounds x 4    Extremities: No calf tenderness bilaterally, no cyanosis, clubbing or edema      LABS:                           16.0   12.65 )-----------( 198      ( 17 Mar 2019 08:51 )             46.5   03-17    137  |  102  |  16  ----------------------------<  108<H>  4.2   |  24  |  0.79    Ca    10.0      17 Mar 2019 07:04        IMAGING:     TTE (3/14): 1. Mitral annular calcification, otherwise normal mitral  valve. Minimal mitral regurgitation. EF 60-65 %  CT Head (3/14/19)  No significant interval change from 3/13/2019.  Similar-appearing right frontotemporal mixed density subdural hemorrhage  measuring 1.0 cm in greatest depth. Similar thin acute subdural hemorrhage  in the interhemispheric fissure and along the tentorial leaves.  No significant midline shift. No hydrocephalus.   CT Head (3/13/19)  Subdural hematomas as described with 0.4 cm leftward midline shift and   partial effacement of basilar cisterns as described.       MEDICATIONS  (STANDING):  dexamethasone     Tablet 4 milliGRAM(s) Oral two times a day  docusate sodium 100 milliGRAM(s) Oral three times a day  levETIRAcetam 500 milliGRAM(s) Oral every 12 hours  metoprolol tartrate 50 milliGRAM(s) Oral two times a day  pantoprazole    Tablet 40 milliGRAM(s) Oral before breakfast  senna 2 Tablet(s) Oral at bedtime    MEDICATIONS  (PRN):  acetaminophen   Tablet .. 650 milliGRAM(s) Oral every 6 hours PRN Temp greater or equal to 38C (100.4F), Mild Pain (1 - 3)  cyclobenzaprine 10 milliGRAM(s) Oral three times a day PRN Muscle Spasm  ondansetron   Disintegrating Tablet 4 milliGRAM(s) Oral every 6 hours PRN Nausea  oxyCODONE    IR 5 milliGRAM(s) Oral every 4 hours PRN Moderate Pain (4 - 6)  oxyCODONE    IR 10 milliGRAM(s) Oral every 4 hours PRN Severe Pain (7 - 10)

## 2019-03-17 NOTE — PROGRESS NOTE ADULT - SUBJECTIVE AND OBJECTIVE BOX
Subjective: Patient seen and examined. No new events except as noted.     SUBJECTIVE/ROS:  No chest pain, dyspnea, palpitation, or dizziness.       MEDICATIONS:  MEDICATIONS  (STANDING):  dexamethasone     Tablet 4 milliGRAM(s) Oral two times a day  docusate sodium 100 milliGRAM(s) Oral three times a day  levETIRAcetam 500 milliGRAM(s) Oral every 12 hours  metoprolol tartrate 50 milliGRAM(s) Oral two times a day  pantoprazole    Tablet 40 milliGRAM(s) Oral before breakfast  senna 2 Tablet(s) Oral at bedtime      PHYSICAL EXAM:  T(C): 36.8 (03-17-19 @ 09:38), Max: 36.8 (03-16-19 @ 13:59)  HR: 64 (03-17-19 @ 09:38) (50 - 64)  BP: 127/75 (03-17-19 @ 09:38) (126/78 - 139/82)  RR: 18 (03-17-19 @ 09:38) (18 - 19)  SpO2: 97% (03-17-19 @ 09:38) (94% - 98%)  Wt(kg): --  I&O's Summary    16 Mar 2019 07:01  -  17 Mar 2019 07:00  --------------------------------------------------------  IN: 480 mL / OUT: 0 mL / NET: 480 mL            JVP: Normal  Neck: supple  Lung: clear   CV: S1 S2 , Murmur:  Abd: soft  Ext: No edema  neuro: Awake / alert  Psych: flat affect  Skin: normal``    LABS/DATA:    CARDIAC MARKERS:                                16.0   12.65 )-----------( 198      ( 17 Mar 2019 08:51 )             46.5     03-17    137  |  102  |  16  ----------------------------<  108<H>  4.2   |  24  |  0.79    Ca    10.0      17 Mar 2019 07:04      proBNP:   Lipid Profile:   HgA1c:   TSH:     TELE:  EKG:

## 2019-03-17 NOTE — PROGRESS NOTE ADULT - ASSESSMENT
aflutter   s/p ablation     mechanical avr  hemodynamically stable   off a/c given SDH   fu with neurosurgery   resume a/c once deemed safe by nsx - would prefer not to delay a/c more than 7-14 days based on some anecdotal literature   echo reviewed , avr with normal hemodynamics, normal LV function

## 2019-03-17 NOTE — PROGRESS NOTE ADULT - ASSESSMENT
Patient is a 48 year old male with hx of MI and mechanical aortic valve on coumadin with persistent headaches after a fall one month prior.  MRI was done as outpatient showing subdural hematoma.  Presented to ED afterwards and admitted to the neurosurgery service for further management. No surgical intervention to date.     PLAN:  - Con't to HOLD anticoagulation in setting of subdural hematoma, restart on POD #7 with heparin drip/no bolus- rpt CTH prior  - f/u cardiology rec's when to restart anticoagulation due to mechanical valve   - Hospitalist medicine following. rec's appreciated   - Con't keppra for seizure prophylaxis  - Con't oxycodone for pain control  - Con't decadron 4mg BID for headache   - Con't DVT ppx  SCDs   - Con't GI ppx senna and colace for bowel regimen.     will discuss with attending Dr. Calles  [phone]

## 2019-03-18 ENCOUNTER — TRANSCRIPTION ENCOUNTER (OUTPATIENT)
Age: 49
End: 2019-03-18

## 2019-03-18 PROCEDURE — 99233 SBSQ HOSP IP/OBS HIGH 50: CPT

## 2019-03-18 RX ADMIN — Medication 50 MILLIGRAM(S): at 17:21

## 2019-03-18 RX ADMIN — LEVETIRACETAM 500 MILLIGRAM(S): 250 TABLET, FILM COATED ORAL at 05:51

## 2019-03-18 RX ADMIN — OXYCODONE HYDROCHLORIDE 10 MILLIGRAM(S): 5 TABLET ORAL at 00:36

## 2019-03-18 RX ADMIN — Medication 4 MILLIGRAM(S): at 17:21

## 2019-03-18 RX ADMIN — Medication 4 MILLIGRAM(S): at 05:51

## 2019-03-18 RX ADMIN — LEVETIRACETAM 500 MILLIGRAM(S): 250 TABLET, FILM COATED ORAL at 17:21

## 2019-03-18 RX ADMIN — OXYCODONE HYDROCHLORIDE 10 MILLIGRAM(S): 5 TABLET ORAL at 01:40

## 2019-03-18 RX ADMIN — PANTOPRAZOLE SODIUM 40 MILLIGRAM(S): 20 TABLET, DELAYED RELEASE ORAL at 05:51

## 2019-03-18 NOTE — PROGRESS NOTE ADULT - ASSESSMENT
Patient is a 48 year old male with hx of MI and mechanical aortic valve on coumadin with persistent headaches after a fall one month prior.  MRI was done as outpatient showing subdural hematoma.  Presented to ED afterwards and admitted to the neurosurgery service for further management. No surgical intervention to date.     PLAN:  - Pain control as needed (Tylenol, Oxy IR prn)  - Keppra 500 mg BID seizure ppx   - Decadron 4 mg BID for headaches   - continue to hold anticoagulation in setting of subdural hematoma, restart on POD #7 with heparin drip/no bolus- Repeat CT prior   - Cardiology following, recommendations appreciated, resume a/c once deemed safe by nsx - would prefer not to delay a/c more than 7-14 days based on some anecdotal literature   - Medicine following, recommendation appreciated   - Dash diet  - Bowel regimen   - DVT ppx with venodynes     Will discuss with neurosurgery attending   Jeff Hodges PA-C # [id number]

## 2019-03-18 NOTE — PROGRESS NOTE ADULT - ASSESSMENT
48 year old male with PMH aflutter s/p ablation (2017), aortic aneurysm s/p Bentall procedure with mechanical aortic valve (2013) on coumadin, gastric bypass presents with headaches c outpatient MRI showing small right acute on chronic SDH. Now awaiting decision on when to resume coumadin.

## 2019-03-18 NOTE — PROGRESS NOTE ADULT - SUBJECTIVE AND OBJECTIVE BOX
SUBJECTIVE: Patient seen and examined, NAD, no acute events overnight, no new complaints today.     Vital Signs Last 24 Hrs  T(C): 36.8 (18 Mar 2019 08:25), Max: 37 (17 Mar 2019 13:42)  T(F): 98.3 (18 Mar 2019 08:25), Max: 98.6 (17 Mar 2019 13:42)  HR: 54 (18 Mar 2019 08:25) (48 - 70)  BP: 127/76 (18 Mar 2019 08:25) (122/78 - 144/80)  BP(mean): --  RR: 18 (18 Mar 2019 08:25) (17 - 18)  SpO2: 95% (18 Mar 2019 08:25) (95% - 95%)    PHYSICAL EXAM:    General: No Acute Distress     Neurological: Awake, alert oriented to person, place and time, Following Commands, PERRL, EOMI, Face Symmetrical, Speech Fluent, Moving all extremities, Muscle Strength normal in all four extremities, No Drift, Sensation to Light Touch Intact    Pulmonary: Clear to Auscultation, No Rales, No Rhonchi, No Wheezes     Cardiovascular: S1, S2, Regular Rate and Rhythm     Gastrointestinal: Soft, Nontender, Nondistended     Incision:     LABS:                        16.0   12.65 )-----------( 198      ( 17 Mar 2019 08:51 )             46.5    03-17    137  |  102  |  16  ----------------------------<  108<H>  4.2   |  24  |  0.79    Ca    10.0      17 Mar 2019 07:04    PT/INR - ( 17 Mar 2019 08:21 )   PT: 12.4 sec;   INR: 1.08 ratio         PTT - ( 17 Mar 2019 08:21 )  PTT:26.9 sec      03-17 @ 07:01  -  03-18 @ 07:00  --------------------------------------------------------  IN: 320 mL / OUT: 0 mL / NET: 320 mL      DRAINS:     MEDICATIONS:  Antibiotics:    Neuro:  acetaminophen   Tablet .. 650 milliGRAM(s) Oral every 6 hours PRN Temp greater or equal to 38C (100.4F), Mild Pain (1 - 3)  cyclobenzaprine 10 milliGRAM(s) Oral three times a day PRN Muscle Spasm  levETIRAcetam 500 milliGRAM(s) Oral every 12 hours  ondansetron   Disintegrating Tablet 4 milliGRAM(s) Oral every 6 hours PRN Nausea  oxyCODONE    IR 5 milliGRAM(s) Oral every 4 hours PRN Moderate Pain (4 - 6)  oxyCODONE    IR 10 milliGRAM(s) Oral every 4 hours PRN Severe Pain (7 - 10)    Cardiac:  metoprolol tartrate 50 milliGRAM(s) Oral two times a day    Pulm:    GI/:  docusate sodium 100 milliGRAM(s) Oral three times a day  pantoprazole    Tablet 40 milliGRAM(s) Oral before breakfast  senna 2 Tablet(s) Oral at bedtime    Other:   dexamethasone     Tablet 4 milliGRAM(s) Oral two times a day    DIET: [] Regular [] CCD [] Renal [] Puree [] Dysphagia [] Tube Feeds: [x] Dash diet     IMAGING:

## 2019-03-18 NOTE — PROGRESS NOTE ADULT - SUBJECTIVE AND OBJECTIVE BOX
Willian Pérez   Pager [phone]  Office [phone]      CC: Patient is a 48y old  Male who presents with a chief complaint of SDH, no surgical intervention (17 Mar 2019 15:53)      SUBJECTIVE / OVERNIGHT EVENTS: No HA. No focal neurologic changes. No CP/SOB. No f/c/r. No N/V/abd pain.    MEDICATIONS  (STANDING):  dexamethasone     Tablet 4 milliGRAM(s) Oral two times a day  docusate sodium 100 milliGRAM(s) Oral three times a day  levETIRAcetam 500 milliGRAM(s) Oral every 12 hours  metoprolol tartrate 50 milliGRAM(s) Oral two times a day  pantoprazole    Tablet 40 milliGRAM(s) Oral before breakfast  senna 2 Tablet(s) Oral at bedtime    MEDICATIONS  (PRN):  acetaminophen   Tablet .. 650 milliGRAM(s) Oral every 6 hours PRN Temp greater or equal to 38C (100.4F), Mild Pain (1 - 3)  cyclobenzaprine 10 milliGRAM(s) Oral three times a day PRN Muscle Spasm  ondansetron   Disintegrating Tablet 4 milliGRAM(s) Oral every 6 hours PRN Nausea  oxyCODONE    IR 5 milliGRAM(s) Oral every 4 hours PRN Moderate Pain (4 - 6)  oxyCODONE    IR 10 milliGRAM(s) Oral every 4 hours PRN Severe Pain (7 - 10)      Vital Signs Last 24 Hrs  T(C): 37 (18 Mar 2019 11:56), Max: 37 (18 Mar 2019 11:56)  T(F): 98.6 (18 Mar 2019 11:56), Max: 98.6 (18 Mar 2019 11:56)  HR: 65 (18 Mar 2019 11:56) (48 - 65)  BP: 142/74 (18 Mar 2019 11:56) (122/78 - 144/80)  BP(mean): --  RR: 18 (18 Mar 2019 11:56) (17 - 18)  SpO2: 95% (18 Mar 2019 11:56) (95% - 95%)  CAPILLARY BLOOD GLUCOSE        I&O's Summary    17 Mar 2019 07:01  -  18 Mar 2019 07:00  --------------------------------------------------------  IN: 320 mL / OUT: 0 mL / NET: 320 mL    18 Mar 2019 07:01  -  18 Mar 2019 15:39  --------------------------------------------------------  IN: 1260 mL / OUT: 0 mL / NET: 1260 mL        PHYSICAL EXAM:    GENERAL: NAD   HEENT: EOMI, PERRL  PULM: Clear to auscultation bilaterally  CV: Regular rate and rhythm; nl S1, S2; No murmurs, rubs, or gallops  ABDOMEN: Soft, Nontender, Nondistended; Bowel sounds present  EXTREMITIES/MSK:  No edema, calf tenderness   PSYCH: AAOx3  NEUROLOGY: non-focal          LABS:                        16.0   12.65 )-----------( 198      ( 17 Mar 2019 08:51 )             46.5     03-17    137  |  102  |  16  ----------------------------<  108<H>  4.2   |  24  |  0.79    Ca    10.0      17 Mar 2019 07:04      PT/INR - ( 17 Mar 2019 08:21 )   PT: 12.4 sec;   INR: 1.08 ratio         PTT - ( 17 Mar 2019 08:21 )  PTT:26.9 sec            RADIOLOGY & ADDITIONAL TESTS:    Imaging Personally Reviewed:    Consultant(s) Notes Reviewed:      Care Discussed with Consultants/Other Providers: neurosurg

## 2019-03-19 DIAGNOSIS — R00.1 BRADYCARDIA, UNSPECIFIED: ICD-10-CM

## 2019-03-19 PROCEDURE — 99231 SBSQ HOSP IP/OBS SF/LOW 25: CPT

## 2019-03-19 PROCEDURE — 99233 SBSQ HOSP IP/OBS HIGH 50: CPT

## 2019-03-19 RX ORDER — OXYCODONE HYDROCHLORIDE 5 MG/1
10 TABLET ORAL EVERY 4 HOURS
Qty: 0 | Refills: 0 | Status: DISCONTINUED | OUTPATIENT
Start: 2019-03-19 | End: 2019-03-25

## 2019-03-19 RX ORDER — METOPROLOL TARTRATE 50 MG
25 TABLET ORAL EVERY 12 HOURS
Qty: 0 | Refills: 0 | Status: DISCONTINUED | OUTPATIENT
Start: 2019-03-19 | End: 2019-03-20

## 2019-03-19 RX ORDER — DEXAMETHASONE 0.5 MG/5ML
2 ELIXIR ORAL
Qty: 0 | Refills: 0 | Status: DISCONTINUED | OUTPATIENT
Start: 2019-03-19 | End: 2019-03-22

## 2019-03-19 RX ORDER — INFLUENZA VIRUS VACCINE 15; 15; 15; 15 UG/.5ML; UG/.5ML; UG/.5ML; UG/.5ML
0.5 SUSPENSION INTRAMUSCULAR ONCE
Qty: 0 | Refills: 0 | Status: COMPLETED | OUTPATIENT
Start: 2019-03-19 | End: 2019-03-19

## 2019-03-19 RX ORDER — OXYCODONE HYDROCHLORIDE 5 MG/1
5 TABLET ORAL EVERY 4 HOURS
Qty: 0 | Refills: 0 | Status: DISCONTINUED | OUTPATIENT
Start: 2019-03-19 | End: 2019-03-25

## 2019-03-19 RX ADMIN — OXYCODONE HYDROCHLORIDE 10 MILLIGRAM(S): 5 TABLET ORAL at 02:32

## 2019-03-19 RX ADMIN — LEVETIRACETAM 500 MILLIGRAM(S): 250 TABLET, FILM COATED ORAL at 05:33

## 2019-03-19 RX ADMIN — OXYCODONE HYDROCHLORIDE 10 MILLIGRAM(S): 5 TABLET ORAL at 03:01

## 2019-03-19 RX ADMIN — Medication 25 MILLIGRAM(S): at 17:45

## 2019-03-19 RX ADMIN — Medication 4 MILLIGRAM(S): at 05:33

## 2019-03-19 RX ADMIN — Medication 2 MILLIGRAM(S): at 17:46

## 2019-03-19 RX ADMIN — LEVETIRACETAM 500 MILLIGRAM(S): 250 TABLET, FILM COATED ORAL at 17:46

## 2019-03-19 RX ADMIN — Medication 650 MILLIGRAM(S): at 11:37

## 2019-03-19 RX ADMIN — PANTOPRAZOLE SODIUM 40 MILLIGRAM(S): 20 TABLET, DELAYED RELEASE ORAL at 05:34

## 2019-03-19 RX ADMIN — Medication 650 MILLIGRAM(S): at 12:00

## 2019-03-19 NOTE — DISCHARGE NOTE NURSING/CASE MANAGEMENT/SOCIAL WORK - NSDCDPATPORTLINK_GEN_ALL_CORE
You can access the [facility] Patient Portal, offered by [facility], by registering with the following website: [email or web address]

## 2019-03-19 NOTE — PROGRESS NOTE ADULT - NSICDXPROBLEM_GEN_ALL_CORE_FT
PROBLEM DIAGNOSES  Problem: Bradycardia  Assessment and Plan: decrease metoprolol. monitor    Problem: History of mechanical aortic valve replacement  Assessment and Plan: AC when cleared by neurosurg    Problem: SDH (subdural hematoma)  Assessment and Plan: monitor per neurosurg    Problem: History of atrial flutter  Assessment and Plan: cont current tx per cards
PROBLEM DIAGNOSES  Problem: History of mechanical aortic valve replacement  Assessment and Plan: AC when cleared by neurosurg    Problem: SDH (subdural hematoma)  Assessment and Plan: monitor per neurosurg    Problem: History of atrial flutter  Assessment and Plan: cont current tx per cards
PROBLEM DIAGNOSES  Problem: History of mechanical aortic valve replacement  Assessment and Plan: s/p INR reversal. Given mechanical valve, the patient is at elevated risk for stroke without AC.  Once the patient is deemed safe to begin anticoagulation per neruosurgery, would start Heparin gtt (without bolus) and monitor neurological status closely. Will need to be on therapeutic doses of coumadin prior to discharge from the hospital.   Cardiology following.    Problem: SDH (subdural hematoma)  Assessment and Plan: Management per neurosurgery.   CT head with stable SDH.  Before initiation of heparin gtt, consider repeating CT head.    Problem: History of atrial flutter  Assessment and Plan: s/p ablation  continue amiodarone
PROBLEM DIAGNOSES  Problem: SDH (subdural hematoma)  Assessment and Plan: Management per neurosurgery.   CT head with stable SDH.    Problem: History of mechanical aortic valve replacement  Assessment and Plan: s/p INR reversal. Given mechanical valve, the patient is at elevated risk for stroke without AC; however given the SDH cannot AC at this time.  Cardiology following.  TTE ordered.    Problem: History of atrial flutter  Assessment and Plan: s/p ablation  continue amiodarone
PROBLEM DIAGNOSES  Problem: History of atrial flutter  Assessment and Plan: - s/p ablation   - continue amiodarone    Problem: History of mechanical aortic valve replacement  Assessment and Plan: s/p INR reversal. Given mechanical valve, the patient is at elevated risk for stroke without AC.    Once the patient is deemed safe to begin anticoagulation per neurosurgery, would start Heparin gtt (without bolus) and monitor neurological status closely. Will need to be on therapeutic doses of coumadin prior to discharge from the hospital.  Per cardiology, would not wait more than another week to resume coumadin  - need neurosurgery okay to resume coumadin  - Cardiology following    Problem: SDH (subdural hematoma)  Assessment and Plan: - Management per neurosurgery.    - CT head with stable SDH.   - Before initiation of heparin gtt, consider repeating CT head

## 2019-03-19 NOTE — PROGRESS NOTE ADULT - SUBJECTIVE AND OBJECTIVE BOX
Willian Pérez   Pager [phone]  Office [phone]      CC: Patient is a 48y old  Male who presents with a chief complaint of SDH, no surgical intervention (17 Mar 2019 15:53)      SUBJECTIVE / OVERNIGHT EVENTS: Feels good. No HA. No focal neuro deficits. No CP/SOB. no f/c/r. no n/v/d/abd pain.    MEDICATIONS  (STANDING):  dexamethasone     Tablet 4 milliGRAM(s) Oral two times a day  docusate sodium 100 milliGRAM(s) Oral three times a day  levETIRAcetam 500 milliGRAM(s) Oral every 12 hours  metoprolol tartrate 25 milliGRAM(s) Oral every 12 hours  pantoprazole    Tablet 40 milliGRAM(s) Oral before breakfast  senna 2 Tablet(s) Oral at bedtime    MEDICATIONS  (PRN):  acetaminophen   Tablet .. 650 milliGRAM(s) Oral every 6 hours PRN Temp greater or equal to 38C (100.4F), Mild Pain (1 - 3)  cyclobenzaprine 10 milliGRAM(s) Oral three times a day PRN Muscle Spasm  ondansetron   Disintegrating Tablet 4 milliGRAM(s) Oral every 6 hours PRN Nausea  oxyCODONE    IR 5 milliGRAM(s) Oral every 4 hours PRN Moderate Pain (4 - 6)  oxyCODONE    IR 10 milliGRAM(s) Oral every 4 hours PRN Severe Pain (7 - 10)      Vital Signs Last 24 Hrs  T(C): 36.7 (19 Mar 2019 07:36), Max: 37 (18 Mar 2019 20:10)  T(F): 98.1 (19 Mar 2019 07:36), Max: 98.6 (18 Mar 2019 20:10)  HR: 50 (19 Mar 2019 07:36) (50 - 56)  BP: 128/71 (19 Mar 2019 07:36) (116/66 - 128/74)  BP(mean): --  RR: 18 (19 Mar 2019 07:36) (18 - 18)  SpO2: 97% (19 Mar 2019 07:36) (94% - 97%)  CAPILLARY BLOOD GLUCOSE        I&O's Summary    18 Mar 2019 07:01  -  19 Mar 2019 07:00  --------------------------------------------------------  IN: 1960 mL / OUT: 0 mL / NET: 1960 mL    19 Mar 2019 07:01  -  19 Mar 2019 13:13  --------------------------------------------------------  IN: 480 mL / OUT: 0 mL / NET: 480 mL          PHYSICAL EXAM:    GENERAL: NAD   HEENT: EOMI, PERRL  PULM: Clear to auscultation bilaterally  CV: Regular rate and rhythm; nl S1, S2; No murmurs, rubs, or gallops  ABDOMEN: Soft, Nontender, Nondistended; Bowel sounds present  EXTREMITIES/MSK:  No edema, calf tenderness   PSYCH: AAOx3  NEUROLOGY: non-focal          LABS:                      RADIOLOGY & ADDITIONAL TESTS:    Imaging Personally Reviewed:    Consultant(s) Notes Reviewed:      Care Discussed with Consultants/Other Providers: neurosurg regard AC

## 2019-03-19 NOTE — DIETITIAN INITIAL EVALUATION ADULT. - OTHER INFO
seen for length of stay, admitted for SDH, no surgical intervention, consuming >75% of meals, denies nausea/vomit, denies difficulty chewing /swallow. last BM yesterday. NKFA. understands that he is overweight and that he needs to change the way he eats, he plans on changing from greasy foods to cereal and Increasing his water intake. he refused diet eduction.

## 2019-03-19 NOTE — DISCHARGE NOTE NURSING/CASE MANAGEMENT/SOCIAL WORK - NSDCPEPTSTRK_GEN_ALL_CORE
Need for follow up after discharge/Prescribed medications/Stroke education booklet/Risk factors for stroke/Stroke support groups for patients, families, and friends/Stroke warning signs and symptoms/Signs and symptoms of stroke/Call 911 for stroke

## 2019-03-19 NOTE — PROGRESS NOTE ADULT - ASSESSMENT
aflutter   s/p ablation     mechanical avr  hemodynamically stable   off a/c given SDH   fu with neurosurgery   resume a/c once deemed safe by nsx - would prefer not to delay a/c more than 7-14 days based on some anecdotal literature   echo reviewed , avr with normal hemodynamics, normal LV function   plan for repeat CT of head as per neurosurgery

## 2019-03-19 NOTE — DIETITIAN INITIAL EVALUATION ADULT. - PERTINENT MEDS FT
acetaminophen   Tablet .. 650 PRN  cyclobenzaprine 10 PRN  dexamethasone     Tablet 4  docusate sodium 100  levETIRAcetam 500  metoprolol tartrate 50  ondansetron   Disintegrating Tablet 4 PRN  oxyCODONE    IR 5 PRN  oxyCODONE    IR 10 PRN  pantoprazole    Tablet 40  senna 2

## 2019-03-19 NOTE — PROGRESS NOTE ADULT - SUBJECTIVE AND OBJECTIVE BOX
Subjective: Patient seen and examined. No new events except as noted.     SUBJECTIVE/ROS:  No chest pain, dyspnea, palpitation, or dizziness.       MEDICATIONS:  MEDICATIONS  (STANDING):  dexamethasone     Tablet 4 milliGRAM(s) Oral two times a day  docusate sodium 100 milliGRAM(s) Oral three times a day  levETIRAcetam 500 milliGRAM(s) Oral every 12 hours  metoprolol tartrate 50 milliGRAM(s) Oral two times a day  pantoprazole    Tablet 40 milliGRAM(s) Oral before breakfast  senna 2 Tablet(s) Oral at bedtime      PHYSICAL EXAM:  T(C): 36.5 (03-19-19 @ 04:19), Max: 37 (03-18-19 @ 11:56)  HR: 54 (03-19-19 @ 04:19) (51 - 65)  BP: 128/74 (03-19-19 @ 04:19) (116/66 - 142/74)  RR: 18 (03-19-19 @ 04:19) (18 - 18)  SpO2: 94% (03-19-19 @ 04:19) (94% - 97%)  Wt(kg): --  I&O's Summary    18 Mar 2019 07:01  -  19 Mar 2019 07:00  --------------------------------------------------------  IN: 1960 mL / OUT: 0 mL / NET: 1960 mL            JVP: Normal  Neck: supple  Lung: clear   CV: S1 S2 , Murmur:  Abd: soft  Ext: No edema  neuro: Awake / alert  Psych: flat affect  Skin: normal``    LABS/DATA:    CARDIAC MARKERS:                                16.0   12.65 )-----------( 198      ( 17 Mar 2019 08:51 )             46.5           proBNP:   Lipid Profile:   HgA1c:   TSH:     TELE:  EKG:

## 2019-03-19 NOTE — DISCHARGE NOTE NURSING/CASE MANAGEMENT/SOCIAL WORK - NSDCPEPTCOWAR_GEN_ALL_CORE
Coumadin/Warfarin - Compliance/Coumadin/Warfarin - Potential for adverse drug reactions and interactions/Coumadin/Warfarin - Dietary Advice/Coumadin/Warfarin - Follow up monitoring

## 2019-03-19 NOTE — PROGRESS NOTE ADULT - SUBJECTIVE AND OBJECTIVE BOX
SUBJECTIVE: Seen and examined at bedside. No acute overnight events. No complaints this morning. Denies headache, blurry vision, chest pain or n/v.     Vital Signs Last 24 Hrs  T(C): 37.1 (19 Mar 2019 11:15), Max: 37.1 (19 Mar 2019 11:15)  T(F): 98.8 (19 Mar 2019 11:15), Max: 98.8 (19 Mar 2019 11:15)  HR: 64 (19 Mar 2019 11:15) (50 - 64)  BP: 136/74 (19 Mar 2019 11:15) (116/66 - 136/74)  BP(mean): --  RR: 18 (19 Mar 2019 11:15) (18 - 18)  SpO2: 97% (19 Mar 2019 11:15) (94% - 97%)    PHYSICAL EXAM:    Constitutional: No Acute Distress     Neurological: Awake, alert, oriented to person, place and time, speech clear and fluent, face equal, tongue midline, extraocular movements intact, no nystagmus briskly following commands, no drift, moves all extremities with 5/5 strength, sensation intact to light touch throughout, ambulating halls independently    Pulmonary: Clear to Auscultation, No rales, No rhonchi, No wheezes     Cardiovascular: + S1, S2,    Gastrointestinal: Soft, Non-tender, Non-distended, +bowel sounds x 4    Extremities: No calf tenderness bilaterally, no cyanosis, clubbing or edema      LABS: no new labs              IMAGING:     TTE (3/14): 1. Mitral annular calcification, otherwise normal mitral  valve. Minimal mitral regurgitation. EF 60-65 %  CT Head (3/14/19)  No significant interval change from 3/13/2019.  Similar-appearing right frontotemporal mixed density subdural hemorrhage  measuring 1.0 cm in greatest depth. Similar thin acute subdural hemorrhage  in the interhemispheric fissure and along the tentorial leaves.  No significant midline shift. No hydrocephalus.   CT Head (3/13/19)  Subdural hematomas as described with 0.4 cm leftward midline shift and   partial effacement of basilar cisterns as described.       MEDICATIONS  (STANDING):  dexamethasone     Tablet 2 milliGRAM(s) Oral two times a day  docusate sodium 100 milliGRAM(s) Oral three times a day  levETIRAcetam 500 milliGRAM(s) Oral every 12 hours  metoprolol tartrate 25 milliGRAM(s) Oral every 12 hours  pantoprazole    Tablet 40 milliGRAM(s) Oral before breakfast  senna 2 Tablet(s) Oral at bedtime    MEDICATIONS  (PRN):  acetaminophen   Tablet .. 650 milliGRAM(s) Oral every 6 hours PRN Temp greater or equal to 38C (100.4F), Mild Pain (1 - 3)  cyclobenzaprine 10 milliGRAM(s) Oral three times a day PRN Muscle Spasm  ondansetron   Disintegrating Tablet 4 milliGRAM(s) Oral every 6 hours PRN Nausea  oxyCODONE    IR 5 milliGRAM(s) Oral every 4 hours PRN Moderate Pain (4 - 6)  oxyCODONE    IR 10 milliGRAM(s) Oral every 4 hours PRN Severe Pain (7 - 10)

## 2019-03-19 NOTE — PROGRESS NOTE ADULT - ASSESSMENT
Patient is a 48 year old male with hx of MI and mechanical aortic valve on coumadin with persistent headaches after a fall one month prior.  MRI was done as outpatient showing subdural hematoma.  Presented to ED afterwards and admitted to the neurosurgery service for further management. No surgical intervention to date.     PLAN:  - Con't to HOLD anticoagulation in setting of subdural hematoma, restart on day#7 with heparin drip/no bolus- rpt CTH prior  - cardiology recommends not to delay a/c more than 7-14 days based on some anecdotal literature for his mechanical valve   - Hospitalist medicine following. rec's appreciated   - Con't keppra for seizure prophylaxis  - Con't oxycodone for pain control  - decr decadron 2mg BID for headache   - Con't DVT ppx  SCDs   - Con't GI ppx senna and colace for bowel regimen.   - f/u am labs including INR and CTH in am, if stable will start heparin drip without bolus    will discuss with attending Dr. Calles  [id number]

## 2019-03-20 DIAGNOSIS — I48.92 UNSPECIFIED ATRIAL FLUTTER: ICD-10-CM

## 2019-03-20 LAB
ANION GAP SERPL CALC-SCNC: 11 MMOL/L — SIGNIFICANT CHANGE UP (ref 5–17)
APTT BLD: 24.8 SEC — LOW (ref 27.5–36.3)
APTT BLD: 34.9 SEC — SIGNIFICANT CHANGE UP (ref 27.5–36.3)
BASOPHILS # BLD AUTO: 0.03 K/UL — SIGNIFICANT CHANGE UP (ref 0–0.2)
BASOPHILS NFR BLD AUTO: 0.2 % — SIGNIFICANT CHANGE UP (ref 0–2)
BUN SERPL-MCNC: 16 MG/DL — SIGNIFICANT CHANGE UP (ref 7–23)
CALCIUM SERPL-MCNC: 9.7 MG/DL — SIGNIFICANT CHANGE UP (ref 8.4–10.5)
CHLORIDE SERPL-SCNC: 101 MMOL/L — SIGNIFICANT CHANGE UP (ref 96–108)
CO2 SERPL-SCNC: 25 MMOL/L — SIGNIFICANT CHANGE UP (ref 22–31)
CREAT SERPL-MCNC: 0.75 MG/DL — SIGNIFICANT CHANGE UP (ref 0.5–1.3)
EOSINOPHIL # BLD AUTO: 0.07 K/UL — SIGNIFICANT CHANGE UP (ref 0–0.5)
EOSINOPHIL NFR BLD AUTO: 0.5 % — SIGNIFICANT CHANGE UP (ref 0–6)
GLUCOSE SERPL-MCNC: 103 MG/DL — HIGH (ref 70–99)
HBA1C BLD-MCNC: 5.5 % — SIGNIFICANT CHANGE UP (ref 4–5.6)
HCT VFR BLD CALC: 48.2 % — SIGNIFICANT CHANGE UP (ref 39–50)
HGB BLD-MCNC: 16.4 G/DL — SIGNIFICANT CHANGE UP (ref 13–17)
IMM GRANULOCYTES NFR BLD AUTO: 0.4 % — SIGNIFICANT CHANGE UP (ref 0–1.5)
INR BLD: 1.05 RATIO — SIGNIFICANT CHANGE UP (ref 0.88–1.16)
LYMPHOCYTES # BLD AUTO: 16.2 % — SIGNIFICANT CHANGE UP (ref 13–44)
LYMPHOCYTES # BLD AUTO: 2.21 K/UL — SIGNIFICANT CHANGE UP (ref 1–3.3)
MCHC RBC-ENTMCNC: 31.8 PG — SIGNIFICANT CHANGE UP (ref 27–34)
MCHC RBC-ENTMCNC: 34 GM/DL — SIGNIFICANT CHANGE UP (ref 32–36)
MCV RBC AUTO: 93.4 FL — SIGNIFICANT CHANGE UP (ref 80–100)
MONOCYTES # BLD AUTO: 1.07 K/UL — HIGH (ref 0–0.9)
MONOCYTES NFR BLD AUTO: 7.8 % — SIGNIFICANT CHANGE UP (ref 2–14)
NEUTROPHILS # BLD AUTO: 10.21 K/UL — HIGH (ref 1.8–7.4)
NEUTROPHILS NFR BLD AUTO: 74.9 % — SIGNIFICANT CHANGE UP (ref 43–77)
PLATELET # BLD AUTO: 191 K/UL — SIGNIFICANT CHANGE UP (ref 150–400)
POTASSIUM SERPL-MCNC: 4.1 MMOL/L — SIGNIFICANT CHANGE UP (ref 3.5–5.3)
POTASSIUM SERPL-SCNC: 4.1 MMOL/L — SIGNIFICANT CHANGE UP (ref 3.5–5.3)
PROTHROM AB SERPL-ACNC: 12 SEC — SIGNIFICANT CHANGE UP (ref 10–13.1)
RBC # BLD: 5.16 M/UL — SIGNIFICANT CHANGE UP (ref 4.2–5.8)
RBC # FLD: 13 % — SIGNIFICANT CHANGE UP (ref 10.3–14.5)
SODIUM SERPL-SCNC: 137 MMOL/L — SIGNIFICANT CHANGE UP (ref 135–145)
WBC # BLD: 13.64 K/UL — HIGH (ref 3.8–10.5)
WBC # FLD AUTO: 13.64 K/UL — HIGH (ref 3.8–10.5)

## 2019-03-20 PROCEDURE — 70450 CT HEAD/BRAIN W/O DYE: CPT | Mod: 26

## 2019-03-20 PROCEDURE — 99232 SBSQ HOSP IP/OBS MODERATE 35: CPT

## 2019-03-20 PROCEDURE — 99233 SBSQ HOSP IP/OBS HIGH 50: CPT

## 2019-03-20 RX ORDER — HEPARIN SODIUM 5000 [USP'U]/ML
1200 INJECTION INTRAVENOUS; SUBCUTANEOUS
Qty: 25000 | Refills: 0 | Status: DISCONTINUED | OUTPATIENT
Start: 2019-03-20 | End: 2019-03-20

## 2019-03-20 RX ORDER — HEPARIN SODIUM 5000 [USP'U]/ML
1500 INJECTION INTRAVENOUS; SUBCUTANEOUS
Qty: 25000 | Refills: 0 | Status: DISCONTINUED | OUTPATIENT
Start: 2019-03-20 | End: 2019-03-21

## 2019-03-20 RX ADMIN — Medication 2 MILLIGRAM(S): at 06:37

## 2019-03-20 RX ADMIN — CYCLOBENZAPRINE HYDROCHLORIDE 10 MILLIGRAM(S): 10 TABLET, FILM COATED ORAL at 22:12

## 2019-03-20 RX ADMIN — SENNA PLUS 2 TABLET(S): 8.6 TABLET ORAL at 21:02

## 2019-03-20 RX ADMIN — HEPARIN SODIUM 15 UNIT(S)/HR: 5000 INJECTION INTRAVENOUS; SUBCUTANEOUS at 23:00

## 2019-03-20 RX ADMIN — Medication 2 MILLIGRAM(S): at 17:26

## 2019-03-20 RX ADMIN — PANTOPRAZOLE SODIUM 40 MILLIGRAM(S): 20 TABLET, DELAYED RELEASE ORAL at 06:36

## 2019-03-20 RX ADMIN — LEVETIRACETAM 500 MILLIGRAM(S): 250 TABLET, FILM COATED ORAL at 17:26

## 2019-03-20 RX ADMIN — Medication 100 MILLIGRAM(S): at 21:02

## 2019-03-20 RX ADMIN — LEVETIRACETAM 500 MILLIGRAM(S): 250 TABLET, FILM COATED ORAL at 06:37

## 2019-03-20 RX ADMIN — HEPARIN SODIUM 12 UNIT(S)/HR: 5000 INJECTION INTRAVENOUS; SUBCUTANEOUS at 15:11

## 2019-03-20 NOTE — PROGRESS NOTE ADULT - SUBJECTIVE AND OBJECTIVE BOX
Chief Complaint:  headaches    HPI:  Patient is a 48 year old male with hx of MI and mechanical aortic valve on coumadin with persistent headaches after a fall one month prior.  MRI was done as outpatient showing subdural hematoma.  Presented to ED afterwards and admitted to the neurosurgery service for further management.      OVERNIGHT EVENTS:  No acute events overnight.  Had repeat CT scan this am.  Plan is to start heparin drip today.  No new complaints.  Has been out of bed.      Vital Signs Last 24 Hrs  T(C): 36.6 (20 Mar 2019 07:53), Max: 36.9 (19 Mar 2019 16:51)  T(F): 97.9 (20 Mar 2019 07:53), Max: 98.5 (19 Mar 2019 16:51)  HR: 54 (20 Mar 2019 07:53) (53 - 105)  BP: 134/79 (20 Mar 2019 07:53) (122/66 - 134/79)  BP(mean): --  RR: 18 (20 Mar 2019 07:53) (16 - 18)  SpO2: 97% (20 Mar 2019 07:53) (96% - 97%)        PHYSICAL EXAM:  Neurological: awake, alert, oriented x3, follows commands, speech clear and fluent, perrl, eomi, face symmetric, tongue midline, no drift b/l, moves all extremities x4 w/ 5/5 strength throughout, sensation present, intact, equal throughout    Cardiovascular: +s1, s2  Respiratory: clear to auscultation b/l  Gastrointestinal: soft, non-distended, non-tender  Genitourinary: +voiding    TUBES/LINES:  [x] none    DIET:  [x] sodium and cholesterol restricted    LABS:                        16.4   13.64 )-----------( 191      ( 20 Mar 2019 08:45 )             48.2     03-20    137  |  101  |  16  ----------------------------<  103<H>  4.1   |  25  |  0.75    Ca    9.7      20 Mar 2019 06:18      PT/INR - ( 20 Mar 2019 08:24 )   PT: 12.0 sec;   INR: 1.05 ratio         PTT - ( 20 Mar 2019 08:24 )  PTT:24.8 sec        Allergies    No Known Allergies        MEDICATIONS:  Antibiotics:  none    Neuro:  acetaminophen   Tablet .. 650 milliGRAM(s) Oral every 6 hours PRN  cyclobenzaprine 10 milliGRAM(s) Oral three times a day PRN  levETIRAcetam 500 milliGRAM(s) Oral every 12 hours  ondansetron   Disintegrating Tablet 4 milliGRAM(s) Oral every 6 hours PRN  oxyCODONE    IR 5 milliGRAM(s) Oral every 4 hours PRN  oxyCODONE    IR 10 milliGRAM(s) Oral every 4 hours PRN    Anticoagulation: heparin drip    OTHER:  dexamethasone     Tablet 2 milliGRAM(s) Oral two times a day  docusate sodium 100 milliGRAM(s) Oral three times a day  metoprolol tartrate 25 milliGRAM(s) Oral every 12 hours  pantoprazole    Tablet 40 milliGRAM(s) Oral before breakfast  senna 2 Tablet(s) Oral at bedtime    IVF:  none    CULTURES:  none    RADIOLOGY & ADDITIONAL TESTS:  CT head:  grossly stable

## 2019-03-20 NOTE — PROGRESS NOTE ADULT - ASSESSMENT
aflutter   s/p ablation     mechanical avr  hemodynamically stable   started on heparin infusion   fu with neurosurgery

## 2019-03-20 NOTE — PROGRESS NOTE ADULT - ASSESSMENT
HPI:  Patient is a 48 year old male with hx of MI and mechanical aortic valve on coumadin with persistent headaches after a fall one month prior.  MRI was done as outpatient showing subdural hematoma.  Presented to ED afterwards and admitted to the neurosurgery service for further management.      PLAN:  -start heparin gtt for mechanical valve, goal aPTT 60-80 as per cardiology Dr. Anderson, q6 hour aPTT, eventual bridge to coumadin  -decadron 2q12  -keppra for seizure prophylaxis  -oxycodone for pain control  -metoprolol for a. fib  -sodium and cholesterol restricted diet  -out of bed with assistance  -incentive spirometer for lung expansion  -pt - no needs upon discharge  -am labs                Assessment:  Please Check When Present   []  GCS  E   V  M     Heart Failure: []Acute, [] acute on chronic , []chronic  Heart Failure:  [] Diastolic (HFpEF), [] Systolic (HFrEF), []Combined (HFpEF and HFrEF), [] RHF, [] Pulm HTN, [] Other    [] SAY, [] ATN, [] AIN, [] other  [] CKD1, [] CKD2, [] CKD 3, [] CKD 4, [] CKD 5, []ESRD    Encephalopathy: [] Metabolic, [] Hepatic, [] toxic, [] Neurological, [] Other    Abnormal Nurtitional Status: [] malnurtition (see nutrition note), [ ]underweight: BMI < 19, [] morbid obesity: BMI >40, [] Cachexia    [] Sepsis  [] hypovolemic shock,[] cardiogenic shock, [] hemorrhagic shock, [] neuogenic shock  [] Acute Respiratory Failure  []Cerebral edema, [] Brain compression/ herniation,   [] Functional quadriplegia  [] Acute blood loss anemia

## 2019-03-20 NOTE — PROGRESS NOTE ADULT - ASSESSMENT
48 year old male with PMH aflutter s/p ablation (2017), aortic aneurysm s/p Bentall procedure with mechanical aortic valve (2013) on coumadin, gastric bypass presents with headaches c outpatient MRI showing small right acute on chronic SDH. Now awaiting decision on when to resume AC.

## 2019-03-20 NOTE — PROGRESS NOTE ADULT - SUBJECTIVE AND OBJECTIVE BOX
Willian Pérez   Pager [phone]  Office [phone]      CC: Patient is a 48y old  Male who presents with a chief complaint of SDH, no surgical intervention (19 Mar 2019 15:42)      SUBJECTIVE / OVERNIGHT EVENTS: Feels good. Had a brief, dull, 4/10, left sided HA c no focal deficits while I was discussing signs/symptoms to pay attention to while on HA drip. HA resolved 5 minutes later after I returned to the room. Pt reports that this HA was different from his admission HA which was 8/10 and sharp (doesnt remember location).     MEDICATIONS  (STANDING):  dexamethasone     Tablet 2 milliGRAM(s) Oral two times a day  docusate sodium 100 milliGRAM(s) Oral three times a day  levETIRAcetam 500 milliGRAM(s) Oral every 12 hours  metoprolol tartrate 25 milliGRAM(s) Oral every 12 hours  pantoprazole    Tablet 40 milliGRAM(s) Oral before breakfast  senna 2 Tablet(s) Oral at bedtime    MEDICATIONS  (PRN):  acetaminophen   Tablet .. 650 milliGRAM(s) Oral every 6 hours PRN Temp greater or equal to 38C (100.4F), Mild Pain (1 - 3)  cyclobenzaprine 10 milliGRAM(s) Oral three times a day PRN Muscle Spasm  ondansetron   Disintegrating Tablet 4 milliGRAM(s) Oral every 6 hours PRN Nausea  oxyCODONE    IR 5 milliGRAM(s) Oral every 4 hours PRN Moderate Pain (4 - 6)  oxyCODONE    IR 10 milliGRAM(s) Oral every 4 hours PRN Severe Pain (7 - 10)      Vital Signs Last 24 Hrs  T(C): 36.6 (20 Mar 2019 07:53), Max: 36.9 (19 Mar 2019 16:51)  T(F): 97.9 (20 Mar 2019 07:53), Max: 98.5 (19 Mar 2019 16:51)  HR: 54 (20 Mar 2019 07:53) (53 - 105)  BP: 134/79 (20 Mar 2019 07:53) (122/66 - 134/79)  BP(mean): --  RR: 18 (20 Mar 2019 07:53) (16 - 18)  SpO2: 97% (20 Mar 2019 07:53) (96% - 97%)  CAPILLARY BLOOD GLUCOSE        I&O's Summary    19 Mar 2019 07:01  -  20 Mar 2019 07:00  --------------------------------------------------------  IN: 1380 mL / OUT: 0 mL / NET: 1380 mL          PHYSICAL EXAM:    GENERAL: NAD   HEENT: EOMI, PERRL  PULM: Clear to auscultation bilaterally  CV: Regular rate and rhythm; nl S1, S2; No murmurs, rubs, or gallops  ABDOMEN: Soft, Nontender, Nondistended; Bowel sounds present  EXTREMITIES/MSK:  No edema, calf tenderness   PSYCH: AAOx3  NEUROLOGY: motor/sensation intact b/l; clear speech; cn 2-12 intact b/l          LABS:                        16.4   13.64 )-----------( 191      ( 20 Mar 2019 08:45 )             48.2     03-20    137  |  101  |  16  ----------------------------<  103<H>  4.1   |  25  |  0.75    Ca    9.7      20 Mar 2019 06:18      PT/INR - ( 20 Mar 2019 08:24 )   PT: 12.0 sec;   INR: 1.05 ratio         PTT - ( 20 Mar 2019 08:24 )  PTT:24.8 sec            RADIOLOGY & ADDITIONAL TESTS:    Imaging Personally Reviewed:    Consultant(s) Notes Reviewed:      Care Discussed with Consultants/Other Providers: neurosurg regarding HA

## 2019-03-20 NOTE — PROGRESS NOTE ADULT - SUBJECTIVE AND OBJECTIVE BOX
Subjective: Patient seen and examined. No new events except as noted.     SUBJECTIVE/ROS:  No chest pain, dyspnea, palpitation, or dizziness.       MEDICATIONS:  MEDICATIONS  (STANDING):  dexamethasone     Tablet 2 milliGRAM(s) Oral two times a day  docusate sodium 100 milliGRAM(s) Oral three times a day  heparin  Infusion 1200 Unit(s)/Hr (12 mL/Hr) IV Continuous <Continuous>  levETIRAcetam 500 milliGRAM(s) Oral every 12 hours  metoprolol tartrate 25 milliGRAM(s) Oral every 12 hours  pantoprazole    Tablet 40 milliGRAM(s) Oral before breakfast  senna 2 Tablet(s) Oral at bedtime      PHYSICAL EXAM:  T(C): 36.7 (03-20-19 @ 15:43), Max: 36.9 (03-19-19 @ 16:51)  HR: 60 (03-20-19 @ 15:43) (53 - 105)  BP: 131/80 (03-20-19 @ 15:43) (122/66 - 134/79)  RR: 18 (03-20-19 @ 15:43) (16 - 18)  SpO2: 98% (03-20-19 @ 15:43) (96% - 98%)  Wt(kg): --  I&O's Summary    19 Mar 2019 07:01  -  20 Mar 2019 07:00  --------------------------------------------------------  IN: 1380 mL / OUT: 0 mL / NET: 1380 mL    20 Mar 2019 07:01  -  20 Mar 2019 16:29  --------------------------------------------------------  IN: 780 mL / OUT: 0 mL / NET: 780 mL            JVP: Normal  Neck: supple  Lung: clear   CV: S1 S2 , Murmur:  Abd: soft  Ext: No edema  neuro: Awake / alert  Psych: flat affect  Skin: normal``    LABS/DATA:    CARDIAC MARKERS:                                16.4   13.64 )-----------( 191      ( 20 Mar 2019 08:45 )             48.2     03-20    137  |  101  |  16  ----------------------------<  103<H>  4.1   |  25  |  0.75    Ca    9.7      20 Mar 2019 06:18      proBNP:   Lipid Profile:   HgA1c: Hemoglobin A1C, Whole Blood: 5.5 % (03-20 @ 08:45)    TSH:     TELE:  EKG:

## 2019-03-21 LAB
ANION GAP SERPL CALC-SCNC: 11 MMOL/L — SIGNIFICANT CHANGE UP (ref 5–17)
APTT BLD: 45 SEC — HIGH (ref 27.5–36.3)
APTT BLD: 49.9 SEC — HIGH (ref 27.5–36.3)
APTT BLD: 58 SEC — HIGH (ref 27.5–36.3)
BUN SERPL-MCNC: 14 MG/DL — SIGNIFICANT CHANGE UP (ref 7–23)
CALCIUM SERPL-MCNC: 9.9 MG/DL — SIGNIFICANT CHANGE UP (ref 8.4–10.5)
CHLORIDE SERPL-SCNC: 101 MMOL/L — SIGNIFICANT CHANGE UP (ref 96–108)
CO2 SERPL-SCNC: 26 MMOL/L — SIGNIFICANT CHANGE UP (ref 22–31)
CREAT SERPL-MCNC: 0.74 MG/DL — SIGNIFICANT CHANGE UP (ref 0.5–1.3)
GLUCOSE SERPL-MCNC: 110 MG/DL — HIGH (ref 70–99)
HCT VFR BLD CALC: 49.8 % — SIGNIFICANT CHANGE UP (ref 39–50)
HGB BLD-MCNC: 16.9 G/DL — SIGNIFICANT CHANGE UP (ref 13–17)
INR BLD: 1.08 RATIO — SIGNIFICANT CHANGE UP (ref 0.88–1.16)
MCHC RBC-ENTMCNC: 32.5 PG — SIGNIFICANT CHANGE UP (ref 27–34)
MCHC RBC-ENTMCNC: 34 GM/DL — SIGNIFICANT CHANGE UP (ref 32–36)
MCV RBC AUTO: 95.7 FL — SIGNIFICANT CHANGE UP (ref 80–100)
PLATELET # BLD AUTO: 180 K/UL — SIGNIFICANT CHANGE UP (ref 150–400)
POTASSIUM SERPL-MCNC: 4.1 MMOL/L — SIGNIFICANT CHANGE UP (ref 3.5–5.3)
POTASSIUM SERPL-SCNC: 4.1 MMOL/L — SIGNIFICANT CHANGE UP (ref 3.5–5.3)
PROTHROM AB SERPL-ACNC: 12.4 SEC — SIGNIFICANT CHANGE UP (ref 10–12.9)
RBC # BLD: 5.2 M/UL — SIGNIFICANT CHANGE UP (ref 4.2–5.8)
RBC # FLD: 13.2 % — SIGNIFICANT CHANGE UP (ref 10.3–14.5)
SODIUM SERPL-SCNC: 138 MMOL/L — SIGNIFICANT CHANGE UP (ref 135–145)
WBC # BLD: 12.8 K/UL — HIGH (ref 3.8–10.5)
WBC # FLD AUTO: 12.8 K/UL — HIGH (ref 3.8–10.5)

## 2019-03-21 PROCEDURE — 99233 SBSQ HOSP IP/OBS HIGH 50: CPT

## 2019-03-21 PROCEDURE — 99231 SBSQ HOSP IP/OBS SF/LOW 25: CPT

## 2019-03-21 RX ORDER — HEPARIN SODIUM 5000 [USP'U]/ML
1600 INJECTION INTRAVENOUS; SUBCUTANEOUS
Qty: 25000 | Refills: 0 | Status: DISCONTINUED | OUTPATIENT
Start: 2019-03-21 | End: 2019-03-21

## 2019-03-21 RX ORDER — DIPHENHYDRAMINE HCL 50 MG
25 CAPSULE ORAL EVERY 4 HOURS
Qty: 0 | Refills: 0 | Status: DISCONTINUED | OUTPATIENT
Start: 2019-03-21 | End: 2019-03-26

## 2019-03-21 RX ORDER — HEPARIN SODIUM 5000 [USP'U]/ML
1800 INJECTION INTRAVENOUS; SUBCUTANEOUS
Qty: 25000 | Refills: 0 | Status: DISCONTINUED | OUTPATIENT
Start: 2019-03-21 | End: 2019-03-22

## 2019-03-21 RX ADMIN — Medication 2 MILLIGRAM(S): at 06:01

## 2019-03-21 RX ADMIN — Medication 100 MILLIGRAM(S): at 06:01

## 2019-03-21 RX ADMIN — LEVETIRACETAM 500 MILLIGRAM(S): 250 TABLET, FILM COATED ORAL at 17:22

## 2019-03-21 RX ADMIN — PANTOPRAZOLE SODIUM 40 MILLIGRAM(S): 20 TABLET, DELAYED RELEASE ORAL at 06:01

## 2019-03-21 RX ADMIN — Medication 100 MILLIGRAM(S): at 20:57

## 2019-03-21 RX ADMIN — HEPARIN SODIUM 16 UNIT(S)/HR: 5000 INJECTION INTRAVENOUS; SUBCUTANEOUS at 08:04

## 2019-03-21 RX ADMIN — Medication 2 MILLIGRAM(S): at 17:22

## 2019-03-21 RX ADMIN — HEPARIN SODIUM 18 UNIT(S)/HR: 5000 INJECTION INTRAVENOUS; SUBCUTANEOUS at 15:20

## 2019-03-21 RX ADMIN — OXYCODONE HYDROCHLORIDE 10 MILLIGRAM(S): 5 TABLET ORAL at 22:27

## 2019-03-21 RX ADMIN — SENNA PLUS 2 TABLET(S): 8.6 TABLET ORAL at 20:57

## 2019-03-21 RX ADMIN — OXYCODONE HYDROCHLORIDE 10 MILLIGRAM(S): 5 TABLET ORAL at 22:57

## 2019-03-21 RX ADMIN — HEPARIN SODIUM 15 UNIT(S)/HR: 5000 INJECTION INTRAVENOUS; SUBCUTANEOUS at 06:03

## 2019-03-21 RX ADMIN — LEVETIRACETAM 500 MILLIGRAM(S): 250 TABLET, FILM COATED ORAL at 06:01

## 2019-03-21 RX ADMIN — HEPARIN SODIUM 18 UNIT(S)/HR: 5000 INJECTION INTRAVENOUS; SUBCUTANEOUS at 22:27

## 2019-03-21 NOTE — PROGRESS NOTE ADULT - ASSESSMENT
48 year old male with PMH aflutter s/p ablation (2017), aortic aneurysm s/p Bentall procedure with mechanical aortic valve (2013) on coumadin, gastric bypass presents with headaches c outpatient MRI showing small right acute on chronic SDH. AC restarted.

## 2019-03-21 NOTE — PROGRESS NOTE ADULT - ASSESSMENT
HPI:  Patient is a 48 year old male with hx of MI and mechanical aortic valve on coumadin with persistent headaches after a fall one month prior.  MRI was done as outpatient showing subdural hematoma.  Presented to ED afterwards and admitted to the neurosurgery service for further management.     PLAN:  -continue heparin drip, now @ 16 mL/hr, q6 hour aPTT, goal 60-80  -keppra for seizure prophylaxis  -oxycodone for pain control  -decadron 2q12  -senna and colace for bowel regimen  -continue protonix  -sodium and cholesterol restricted diet  -out of bed with assistance  -incentive spirometer for lung expansion  -pt - no needs upon discharge  -am labs    Spectra #[id number]                Assessment:  Please Check When Present   []  GCS  E   V  M     Heart Failure: []Acute, [] acute on chronic , []chronic  Heart Failure:  [] Diastolic (HFpEF), [] Systolic (HFrEF), []Combined (HFpEF and HFrEF), [] RHF, [] Pulm HTN, [] Other    [] SAY, [] ATN, [] AIN, [] other  [] CKD1, [] CKD2, [] CKD 3, [] CKD 4, [] CKD 5, []ESRD    Encephalopathy: [] Metabolic, [] Hepatic, [] toxic, [] Neurological, [] Other    Abnormal Nurtitional Status: [] malnurtition (see nutrition note), [ ]underweight: BMI < 19, [] morbid obesity: BMI >40, [] Cachexia    [] Sepsis  [] hypovolemic shock,[] cardiogenic shock, [] hemorrhagic shock, [] neuogenic shock  [] Acute Respiratory Failure  []Cerebral edema, [] Brain compression/ herniation,   [] Functional quadriplegia  [] Acute blood loss anemia

## 2019-03-21 NOTE — PROGRESS NOTE ADULT - ASSESSMENT
aflutter   s/p ablation     mechanical avr  hemodynamically stable   started on heparin infusion   dose coumadin   fu with neurosurgery

## 2019-03-21 NOTE — PROGRESS NOTE ADULT - SUBJECTIVE AND OBJECTIVE BOX
Subjective: Patient seen and examined. No new events except as noted.     SUBJECTIVE/ROS:    No chest pain, dyspnea, palpitation, or dizziness.     MEDICATIONS:  MEDICATIONS  (STANDING):  dexamethasone     Tablet 2 milliGRAM(s) Oral two times a day  docusate sodium 100 milliGRAM(s) Oral three times a day  heparin  Infusion 1800 Unit(s)/Hr (18 mL/Hr) IV Continuous <Continuous>  levETIRAcetam 500 milliGRAM(s) Oral every 12 hours  pantoprazole    Tablet 40 milliGRAM(s) Oral before breakfast  senna 2 Tablet(s) Oral at bedtime      PHYSICAL EXAM:  T(C): 37.1 (03-21-19 @ 11:49), Max: 37.1 (03-21-19 @ 11:49)  HR: 62 (03-21-19 @ 11:49) (54 - 80)  BP: 143/82 (03-21-19 @ 11:49) (123/72 - 143/82)  RR: 18 (03-21-19 @ 11:49) (18 - 18)  SpO2: 96% (03-21-19 @ 11:49) (94% - 98%)  Wt(kg): --  I&O's Summary    20 Mar 2019 07:01  -  21 Mar 2019 07:00  --------------------------------------------------------  IN: 1320 mL / OUT: 0 mL / NET: 1320 mL            JVP: Normal  Neck: supple  Lung: clear   CV: S1 S2 , Murmur:  Abd: soft  Ext: No edema  neuro: Awake / alert  Psych: flat affect  Skin: normal``    LABS/DATA:    CARDIAC MARKERS:                                16.9   12.8  )-----------( 180      ( 21 Mar 2019 06:10 )             49.8     03-21    138  |  101  |  14  ----------------------------<  110<H>  4.1   |  26  |  0.74    Ca    9.9      21 Mar 2019 06:10      proBNP:   Lipid Profile:   HgA1c:   TSH:     TELE:  EKG:

## 2019-03-21 NOTE — PROGRESS NOTE ADULT - SUBJECTIVE AND OBJECTIVE BOX
Willian Pérez   Pager [phone]  Office [phone]      CC: Patient is a 48y old  Male who presents with a chief complaint of subdural hematoma (21 Mar 2019 10:31)      SUBJECTIVE / OVERNIGHT EVENTS: Feels good. No HA. No bleeding. Does report dark stools for a couple of days with last episode yest. No N/V/abd pain. No CP/SOB. No neurologic deficits.    MEDICATIONS  (STANDING):  dexamethasone     Tablet 2 milliGRAM(s) Oral two times a day  docusate sodium 100 milliGRAM(s) Oral three times a day  heparin  Infusion 1600 Unit(s)/Hr (16 mL/Hr) IV Continuous <Continuous>  levETIRAcetam 500 milliGRAM(s) Oral every 12 hours  pantoprazole    Tablet 40 milliGRAM(s) Oral before breakfast  senna 2 Tablet(s) Oral at bedtime    MEDICATIONS  (PRN):  acetaminophen   Tablet .. 650 milliGRAM(s) Oral every 6 hours PRN Temp greater or equal to 38C (100.4F), Mild Pain (1 - 3)  cyclobenzaprine 10 milliGRAM(s) Oral three times a day PRN Muscle Spasm  ondansetron   Disintegrating Tablet 4 milliGRAM(s) Oral every 6 hours PRN Nausea  oxyCODONE    IR 5 milliGRAM(s) Oral every 4 hours PRN Moderate Pain (4 - 6)  oxyCODONE    IR 10 milliGRAM(s) Oral every 4 hours PRN Severe Pain (7 - 10)      Vital Signs Last 24 Hrs  T(C): 36.6 (21 Mar 2019 07:51), Max: 36.9 (20 Mar 2019 21:00)  T(F): 97.9 (21 Mar 2019 07:51), Max: 98.4 (20 Mar 2019 21:00)  HR: 80 (21 Mar 2019 07:51) (54 - 80)  BP: 136/80 (21 Mar 2019 07:51) (123/72 - 138/74)  BP(mean): 97 (20 Mar 2019 15:43) (97 - 97)  RR: 18 (21 Mar 2019 07:51) (18 - 18)  SpO2: 96% (21 Mar 2019 07:51) (94% - 98%)  CAPILLARY BLOOD GLUCOSE        I&O's Summary    20 Mar 2019 07:01  -  21 Mar 2019 07:00  --------------------------------------------------------  IN: 1320 mL / OUT: 0 mL / NET: 1320 mL          PHYSICAL EXAM:    GENERAL: NAD   HEENT: EOMI, PERRL  PULM: Clear to auscultation bilaterally  CV: Regular rate and rhythm; nl S1, S2; No murmurs, rubs, or gallops  ABDOMEN: Soft, Nontender, Nondistended; Bowel sounds present  RECTAL: brown stool  EXTREMITIES/MSK:  No edema, calf tenderness   PSYCH: AAOx3  NEUROLOGY: non-focal          LABS:                        16.9   12.8  )-----------( 180      ( 21 Mar 2019 06:10 )             49.8     03-21    138  |  101  |  14  ----------------------------<  110<H>  4.1   |  26  |  0.74    Ca    9.9      21 Mar 2019 06:10      PT/INR - ( 21 Mar 2019 06:10 )   PT: 12.4 sec;   INR: 1.08 ratio         PTT - ( 21 Mar 2019 06:10 )  PTT:45.0 sec            RADIOLOGY & ADDITIONAL TESTS:    Imaging Personally Reviewed: CT head    Consultant(s) Notes Reviewed:      Care Discussed with Consultants/Other Providers: neurosurg

## 2019-03-21 NOTE — PROGRESS NOTE ADULT - SUBJECTIVE AND OBJECTIVE BOX
Chief Complaint:  headaches    HPI:  Patient is a 48 year old male with hx of MI and mechanical aortic valve on coumadin with persistent headaches after a fall one month prior.  MRI was done as outpatient showing subdural hematoma.  Presented to ED afterwards and admitted to the neurosurgery service for further management.      OVERNIGHT EVENTS:  No acute events overnight.  Denies any headaches.  Tolerating diet with no difficulties.  Was started on heparin drip yesterday.  Has been out of bed.    Vital Signs Last 24 Hrs  T(C): 36.6 (21 Mar 2019 07:51), Max: 36.9 (20 Mar 2019 21:00)  T(F): 97.9 (21 Mar 2019 07:51), Max: 98.4 (20 Mar 2019 21:00)  HR: 80 (21 Mar 2019 07:51) (54 - 80)  BP: 136/80 (21 Mar 2019 07:51) (123/72 - 138/74)  BP(mean): 97 (20 Mar 2019 15:43) (97 - 97)  RR: 18 (21 Mar 2019 07:51) (18 - 18)  SpO2: 96% (21 Mar 2019 07:51) (94% - 98%)        PHYSICAL EXAM:  Neurological: awake, alert, oriented x3, follows commands, speech clear and fluent, perrl, eomi, face symmetric, tongue midline, no drift b/l, moves all extremities x4 w/ 5/5 strength throughout, sensation present, intact, equal throughout    Cardiovascular: +s1, s2  Respiratory: clear to auscultation b/l  Gastrointestinal: soft, non-distended, non-tender  Genitourinary: +voiding    TUBES/LINES:  [x] none    DIET:  [x] sodium and cholesterol restricted    LABS:                        16.9   12.8  )-----------( 180      ( 21 Mar 2019 06:10 )             49.8     03-21    138  |  101  |  14  ----------------------------<  110<H>  4.1   |  26  |  0.74    Ca    9.9      21 Mar 2019 06:10      PT/INR - ( 21 Mar 2019 06:10 )   PT: 12.4 sec;   INR: 1.08 ratio         PTT - ( 21 Mar 2019 06:10 )  PTT:45.0 sec        Allergies    No Known Allergies        MEDICATIONS:  Antibiotics:  none    Neuro:  acetaminophen   Tablet .. 650 milliGRAM(s) Oral every 6 hours PRN  cyclobenzaprine 10 milliGRAM(s) Oral three times a day PRN  levETIRAcetam 500 milliGRAM(s) Oral every 12 hours  ondansetron   Disintegrating Tablet 4 milliGRAM(s) Oral every 6 hours PRN  oxyCODONE    IR 5 milliGRAM(s) Oral every 4 hours PRN  oxyCODONE    IR 10 milliGRAM(s) Oral every 4 hours PRN    Anticoagulation:  heparin  Infusion 1600 Unit(s)/Hr IV Continuous <Continuous>    OTHER:  dexamethasone     Tablet 2 milliGRAM(s) Oral two times a day  docusate sodium 100 milliGRAM(s) Oral three times a day  pantoprazole    Tablet 40 milliGRAM(s) Oral before breakfast  senna 2 Tablet(s) Oral at bedtime    IVF:  none    CULTURES:  none    RADIOLOGY & ADDITIONAL TESTS:  CT head (3/20):  grossly stable

## 2019-03-22 LAB
ANION GAP SERPL CALC-SCNC: 11 MMOL/L — SIGNIFICANT CHANGE UP (ref 5–17)
APTT BLD: 49.9 SEC — HIGH (ref 27.5–36.3)
APTT BLD: 59.4 SEC — HIGH (ref 27.5–36.3)
APTT BLD: 82.7 SEC — HIGH (ref 27.5–36.3)
BUN SERPL-MCNC: 13 MG/DL — SIGNIFICANT CHANGE UP (ref 7–23)
CALCIUM SERPL-MCNC: 9.8 MG/DL — SIGNIFICANT CHANGE UP (ref 8.4–10.5)
CHLORIDE SERPL-SCNC: 102 MMOL/L — SIGNIFICANT CHANGE UP (ref 96–108)
CO2 SERPL-SCNC: 25 MMOL/L — SIGNIFICANT CHANGE UP (ref 22–31)
CREAT SERPL-MCNC: 0.8 MG/DL — SIGNIFICANT CHANGE UP (ref 0.5–1.3)
GLUCOSE SERPL-MCNC: 111 MG/DL — HIGH (ref 70–99)
HCT VFR BLD CALC: 49.3 % — SIGNIFICANT CHANGE UP (ref 39–50)
HGB BLD-MCNC: 16.8 G/DL — SIGNIFICANT CHANGE UP (ref 13–17)
MCHC RBC-ENTMCNC: 32.5 PG — SIGNIFICANT CHANGE UP (ref 27–34)
MCHC RBC-ENTMCNC: 34.1 GM/DL — SIGNIFICANT CHANGE UP (ref 32–36)
MCV RBC AUTO: 95.2 FL — SIGNIFICANT CHANGE UP (ref 80–100)
PLATELET # BLD AUTO: 190 K/UL — SIGNIFICANT CHANGE UP (ref 150–400)
POTASSIUM SERPL-MCNC: 4.3 MMOL/L — SIGNIFICANT CHANGE UP (ref 3.5–5.3)
POTASSIUM SERPL-SCNC: 4.3 MMOL/L — SIGNIFICANT CHANGE UP (ref 3.5–5.3)
RBC # BLD: 5.18 M/UL — SIGNIFICANT CHANGE UP (ref 4.2–5.8)
RBC # FLD: 12.8 % — SIGNIFICANT CHANGE UP (ref 10.3–14.5)
SODIUM SERPL-SCNC: 138 MMOL/L — SIGNIFICANT CHANGE UP (ref 135–145)
WBC # BLD: 11.8 K/UL — HIGH (ref 3.8–10.5)
WBC # FLD AUTO: 11.8 K/UL — HIGH (ref 3.8–10.5)

## 2019-03-22 PROCEDURE — 99233 SBSQ HOSP IP/OBS HIGH 50: CPT

## 2019-03-22 PROCEDURE — 99232 SBSQ HOSP IP/OBS MODERATE 35: CPT

## 2019-03-22 RX ORDER — HEPARIN SODIUM 5000 [USP'U]/ML
1700 INJECTION INTRAVENOUS; SUBCUTANEOUS
Qty: 25000 | Refills: 0 | Status: DISCONTINUED | OUTPATIENT
Start: 2019-03-22 | End: 2019-03-26

## 2019-03-22 RX ORDER — HEPARIN SODIUM 5000 [USP'U]/ML
1600 INJECTION INTRAVENOUS; SUBCUTANEOUS
Qty: 25000 | Refills: 0 | Status: DISCONTINUED | OUTPATIENT
Start: 2019-03-22 | End: 2019-03-22

## 2019-03-22 RX ORDER — LANOLIN ALCOHOL/MO/W.PET/CERES
5 CREAM (GRAM) TOPICAL AT BEDTIME
Qty: 0 | Refills: 0 | Status: DISCONTINUED | OUTPATIENT
Start: 2019-03-22 | End: 2019-03-25

## 2019-03-22 RX ORDER — WARFARIN SODIUM 2.5 MG/1
10 TABLET ORAL
Qty: 0 | Refills: 0 | Status: DISCONTINUED | OUTPATIENT
Start: 2019-03-22 | End: 2019-03-22

## 2019-03-22 RX ORDER — WARFARIN SODIUM 2.5 MG/1
15 TABLET ORAL
Qty: 0 | Refills: 0 | Status: COMPLETED | OUTPATIENT
Start: 2019-03-22 | End: 2019-03-22

## 2019-03-22 RX ORDER — DEXAMETHASONE 0.5 MG/5ML
1 ELIXIR ORAL
Qty: 0 | Refills: 0 | Status: DISCONTINUED | OUTPATIENT
Start: 2019-03-22 | End: 2019-03-25

## 2019-03-22 RX ADMIN — PANTOPRAZOLE SODIUM 40 MILLIGRAM(S): 20 TABLET, DELAYED RELEASE ORAL at 08:15

## 2019-03-22 RX ADMIN — Medication 2 MILLIGRAM(S): at 04:54

## 2019-03-22 RX ADMIN — Medication 1 MILLIGRAM(S): at 17:46

## 2019-03-22 RX ADMIN — LEVETIRACETAM 500 MILLIGRAM(S): 250 TABLET, FILM COATED ORAL at 17:45

## 2019-03-22 RX ADMIN — HEPARIN SODIUM 16 UNIT(S)/HR: 5000 INJECTION INTRAVENOUS; SUBCUTANEOUS at 05:39

## 2019-03-22 RX ADMIN — WARFARIN SODIUM 15 MILLIGRAM(S): 2.5 TABLET ORAL at 20:16

## 2019-03-22 RX ADMIN — Medication 100 MILLIGRAM(S): at 04:54

## 2019-03-22 RX ADMIN — LEVETIRACETAM 500 MILLIGRAM(S): 250 TABLET, FILM COATED ORAL at 04:54

## 2019-03-22 RX ADMIN — HEPARIN SODIUM 18 UNIT(S)/HR: 5000 INJECTION INTRAVENOUS; SUBCUTANEOUS at 18:21

## 2019-03-22 NOTE — PROGRESS NOTE ADULT - SUBJECTIVE AND OBJECTIVE BOX
Patient is a 48 year old male with hx of MI and mechanical aortic valve on coumadin with persistent headaches after a fall one month prior.  MRI was done as outpatient showing subdural hematoma.  Presented to ED afterwards and admitted to the neurosurgery service for further management.      OVERNIGHT EVENTS:  No acute events overnight.  Denies any headaches.      Vital Signs Last 24 Hrs  T(C): 36.6 (22 Mar 2019 07:20), Max: 36.9 (21 Mar 2019 23:56)  T(F): 97.8 (22 Mar 2019 07:20), Max: 98.4 (21 Mar 2019 23:56)  HR: 72 (22 Mar 2019 07:20) (57 - 72)  BP: 134/77 (22 Mar 2019 07:20) (130/83 - 137/79)  BP(mean): --  RR: 18 (22 Mar 2019 07:20) (18 - 18)  SpO2: 95% (22 Mar 2019 07:20) (94% - 95%)    PHYSICAL EXAM:  Neurological: awake, alert, oriented x3, follows commands, speech clear and fluent, perrla bilat, eomi, face symmetric, tongue midline, no drift b/l, moves all extremities x4 w/ 5/5 strength throughout, sensation present, intact, equal throughout; ambulating independently  Cardiovascular: +s1, s2  Respiratory: clear to auscultation b/l  Gastrointestinal: soft, non-distended, non-tender  Genitourinary: +voiding    LABS:                         16.8   11.8  )-----------( 190      ( 22 Mar 2019 04:57 )             49.3   03-22    138  |  102  |  13  ----------------------------<  111<H>  4.3   |  25  |  0.80    Ca    9.8      22 Mar 2019 04:57    PT/INR - ( 21 Mar 2019 06:10 )   PT: 12.4 sec;   INR: 1.08 ratio      PTT - ( 22 Mar 2019 10:55 )  PTT:49.9 sec    MEDICATIONS  (STANDING):  dexamethasone     Tablet 1 milliGRAM(s) Oral two times a day  docusate sodium 100 milliGRAM(s) Oral three times a day  heparin  Infusion 1700 Unit(s)/Hr (17 mL/Hr) IV Continuous <Continuous>  levETIRAcetam 500 milliGRAM(s) Oral every 12 hours  pantoprazole    Tablet 40 milliGRAM(s) Oral before breakfast  senna 2 Tablet(s) Oral at bedtime  warfarin 10 milliGRAM(s) Oral <User Schedule>    MEDICATIONS  (PRN):  acetaminophen   Tablet .. 650 milliGRAM(s) Oral every 6 hours PRN Temp greater or equal to 38C (100.4F), Mild Pain (1 - 3)  cyclobenzaprine 10 milliGRAM(s) Oral three times a day PRN Muscle Spasm  diphenhydrAMINE 25 milliGRAM(s) Oral every 4 hours PRN Insomnia  ondansetron   Disintegrating Tablet 4 milliGRAM(s) Oral every 6 hours PRN Nausea  oxyCODONE    IR 5 milliGRAM(s) Oral every 4 hours PRN Moderate Pain (4 - 6)  oxyCODONE    IR 10 milliGRAM(s) Oral every 4 hours PRN Severe Pain (7 - 10)    IMAGING:  < from: CT Head No Cont (03.20.19 @ 08:49) >  INTERPRETATION:  History: Intracranial hemorrhage follow-up. Follow-up   subdural hematoma.    Description: A noncontrast head CT is compared to prior CT study from   03/14/2019.    Right frontal parietal temporal mixed density subdural hemorrhage is   grossly stable in size. There has been a mild interval decrease in the   density of the hemorrhage over the time interval, consistent with   appropriate evolution of hemorrhagic products. Minimal interhemispheric   and tentorial subdural hemorrhage is unchanged. Minimal right to left   shift is stable.    There is no evidence for acute infarct or brain parenchymal hemorrhage.    Mild mucosal thickening involves right maxillary sinus.    IMPRESSION:    The right frontal parietal temporal mixed density subdural hemorrhage is   grossly stable in overall size.        < end of copied text >

## 2019-03-22 NOTE — PROGRESS NOTE ADULT - SUBJECTIVE AND OBJECTIVE BOX
Willian Pérez   Pager [phone]  Office [phone]      CC: Patient is a 48y old  Male who presents with a chief complaint of subdural hematoma (22 Mar 2019 11:47)      SUBJECTIVE / OVERNIGHT EVENTS:    MEDICATIONS  (STANDING):  dexamethasone     Tablet 1 milliGRAM(s) Oral two times a day  docusate sodium 100 milliGRAM(s) Oral three times a day  heparin  Infusion 1700 Unit(s)/Hr (17 mL/Hr) IV Continuous <Continuous>  levETIRAcetam 500 milliGRAM(s) Oral every 12 hours  pantoprazole    Tablet 40 milliGRAM(s) Oral before breakfast  senna 2 Tablet(s) Oral at bedtime  warfarin 15 milliGRAM(s) Oral <User Schedule>    MEDICATIONS  (PRN):  acetaminophen   Tablet .. 650 milliGRAM(s) Oral every 6 hours PRN Temp greater or equal to 38C (100.4F), Mild Pain (1 - 3)  cyclobenzaprine 10 milliGRAM(s) Oral three times a day PRN Muscle Spasm  diphenhydrAMINE 25 milliGRAM(s) Oral every 4 hours PRN Insomnia  ondansetron   Disintegrating Tablet 4 milliGRAM(s) Oral every 6 hours PRN Nausea  oxyCODONE    IR 5 milliGRAM(s) Oral every 4 hours PRN Moderate Pain (4 - 6)  oxyCODONE    IR 10 milliGRAM(s) Oral every 4 hours PRN Severe Pain (7 - 10)      Vital Signs Last 24 Hrs  T(C): 37.1 (22 Mar 2019 11:36), Max: 37.1 (22 Mar 2019 11:36)  T(F): 98.7 (22 Mar 2019 11:36), Max: 98.7 (22 Mar 2019 11:36)  HR: 65 (22 Mar 2019 11:36) (57 - 72)  BP: 137/74 (22 Mar 2019 11:36) (130/83 - 137/79)  BP(mean): --  RR: 18 (22 Mar 2019 11:36) (18 - 18)  SpO2: 95% (22 Mar 2019 11:36) (94% - 95%)  CAPILLARY BLOOD GLUCOSE        I&O's Summary    21 Mar 2019 07:01  -  22 Mar 2019 07:00  --------------------------------------------------------  IN: 824 mL / OUT: 0 mL / NET: 824 mL          PHYSICAL EXAM:    GENERAL: NAD   HEENT: EOMI, PERRL  PULM: Clear to auscultation bilaterally  CV: Regular rate and rhythm; nl S1, S2; No murmurs, rubs, or gallops  ABDOMEN: Soft, Nontender, Nondistended; Bowel sounds present  EXTREMITIES/MSK:  No edema, calf tenderness   PSYCH: AAOx3  NEUROLOGY: non-focal          LABS:                        16.8   11.8  )-----------( 190      ( 22 Mar 2019 04:57 )             49.3     03-22    138  |  102  |  13  ----------------------------<  111<H>  4.3   |  25  |  0.80    Ca    9.8      22 Mar 2019 04:57      PT/INR - ( 21 Mar 2019 06:10 )   PT: 12.4 sec;   INR: 1.08 ratio         PTT - ( 22 Mar 2019 10:55 )  PTT:49.9 sec            RADIOLOGY & ADDITIONAL TESTS:    Imaging Personally Reviewed:    Consultant(s) Notes Reviewed:      Care Discussed with Consultants/Other Providers: Willian Pérez   Pager [phone]  Office [phone]      CC: Patient is a 48y old  Male who presents with a chief complaint of subdural hematoma (22 Mar 2019 11:47)      SUBJECTIVE / OVERNIGHT EVENTS: Feels good. No HA. No neurologic changes. No CP/SOB.    MEDICATIONS  (STANDING):  dexamethasone     Tablet 1 milliGRAM(s) Oral two times a day  docusate sodium 100 milliGRAM(s) Oral three times a day  heparin  Infusion 1700 Unit(s)/Hr (17 mL/Hr) IV Continuous <Continuous>  levETIRAcetam 500 milliGRAM(s) Oral every 12 hours  pantoprazole    Tablet 40 milliGRAM(s) Oral before breakfast  senna 2 Tablet(s) Oral at bedtime  warfarin 15 milliGRAM(s) Oral <User Schedule>    MEDICATIONS  (PRN):  acetaminophen   Tablet .. 650 milliGRAM(s) Oral every 6 hours PRN Temp greater or equal to 38C (100.4F), Mild Pain (1 - 3)  cyclobenzaprine 10 milliGRAM(s) Oral three times a day PRN Muscle Spasm  diphenhydrAMINE 25 milliGRAM(s) Oral every 4 hours PRN Insomnia  ondansetron   Disintegrating Tablet 4 milliGRAM(s) Oral every 6 hours PRN Nausea  oxyCODONE    IR 5 milliGRAM(s) Oral every 4 hours PRN Moderate Pain (4 - 6)  oxyCODONE    IR 10 milliGRAM(s) Oral every 4 hours PRN Severe Pain (7 - 10)      Vital Signs Last 24 Hrs  T(C): 37.1 (22 Mar 2019 11:36), Max: 37.1 (22 Mar 2019 11:36)  T(F): 98.7 (22 Mar 2019 11:36), Max: 98.7 (22 Mar 2019 11:36)  HR: 65 (22 Mar 2019 11:36) (57 - 72)  BP: 137/74 (22 Mar 2019 11:36) (130/83 - 137/79)  BP(mean): --  RR: 18 (22 Mar 2019 11:36) (18 - 18)  SpO2: 95% (22 Mar 2019 11:36) (94% - 95%)  CAPILLARY BLOOD GLUCOSE        I&O's Summary    21 Mar 2019 07:01  -  22 Mar 2019 07:00  --------------------------------------------------------  IN: 824 mL / OUT: 0 mL / NET: 824 mL          PHYSICAL EXAM:    GENERAL: NAD   HEENT: EOMI, PERRL  PULM: Clear to auscultation bilaterally  CV: Regular rate and rhythm; nl S1, S2; No murmurs, rubs, or gallops  ABDOMEN: Soft, Nontender, Nondistended; Bowel sounds present  EXTREMITIES/MSK:  No edema, calf tenderness   PSYCH: AAOx3  NEUROLOGY: non-focal          LABS:                        16.8   11.8  )-----------( 190      ( 22 Mar 2019 04:57 )             49.3     03-22    138  |  102  |  13  ----------------------------<  111<H>  4.3   |  25  |  0.80    Ca    9.8      22 Mar 2019 04:57      PT/INR - ( 21 Mar 2019 06:10 )   PT: 12.4 sec;   INR: 1.08 ratio         PTT - ( 22 Mar 2019 10:55 )  PTT:49.9 sec            RADIOLOGY & ADDITIONAL TESTS:    Imaging Personally Reviewed:    Consultant(s) Notes Reviewed:      Care Discussed with Consultants/Other Providers: neurosurg

## 2019-03-22 NOTE — PROGRESS NOTE ADULT - ASSESSMENT
aflutter   s/p ablation     mechanical avr  hemodynamically stable   started on heparin infusion   would start coumadin   fu with neurosurgery

## 2019-03-22 NOTE — PROGRESS NOTE ADULT - SUBJECTIVE AND OBJECTIVE BOX
Subjective: Patient seen and examined. No new events except as noted.     SUBJECTIVE/ROS:  No chest pain, dyspnea, palpitation, or dizziness.       MEDICATIONS:  MEDICATIONS  (STANDING):  dexamethasone     Tablet 1 milliGRAM(s) Oral two times a day  docusate sodium 100 milliGRAM(s) Oral three times a day  heparin  Infusion 1600 Unit(s)/Hr (16 mL/Hr) IV Continuous <Continuous>  levETIRAcetam 500 milliGRAM(s) Oral every 12 hours  pantoprazole    Tablet 40 milliGRAM(s) Oral before breakfast  senna 2 Tablet(s) Oral at bedtime      PHYSICAL EXAM:  T(C): 36.6 (03-22-19 @ 07:20), Max: 37.1 (03-21-19 @ 11:49)  HR: 72 (03-22-19 @ 07:20) (57 - 72)  BP: 134/77 (03-22-19 @ 07:20) (130/83 - 143/82)  RR: 18 (03-22-19 @ 07:20) (18 - 18)  SpO2: 95% (03-22-19 @ 07:20) (94% - 96%)  Wt(kg): --  I&O's Summary    21 Mar 2019 07:01  -  22 Mar 2019 07:00  --------------------------------------------------------  IN: 824 mL / OUT: 0 mL / NET: 824 mL            JVP: Normal  Neck: supple  Lung: clear   CV: S1 S2 , Murmur:  Abd: soft  Ext: No edema  neuro: Awake / alert  Psych: flat affect  Skin: normal``    LABS/DATA:    CARDIAC MARKERS:                                16.8   11.8  )-----------( 190      ( 22 Mar 2019 04:57 )             49.3     03-22    138  |  102  |  13  ----------------------------<  111<H>  4.3   |  25  |  0.80    Ca    9.8      22 Mar 2019 04:57      proBNP:   Lipid Profile:   HgA1c:   TSH:     TELE:  EKG:

## 2019-03-22 NOTE — PROGRESS NOTE ADULT - ASSESSMENT
HPI:  Patient is a 48 year old male with hx of MI and mechanical aortic valve on coumadin with persistent headaches after a fall one month prior.  MRI was done as outpatient showing subdural hematoma.  Presented to ED afterwards and admitted to the neurosurgery service for further management.     PLAN:  -continue heparin drip, now @ 17 mL/hr, q6 hour aPTT, goal 60-80; will send INR with next lab draw and start him back on his coumadin at 10mg (takes 15mg daily M to F and 15mg sat/sun)  -keppra for seizure prophylaxis  -oxycodone for pain control  -decadron 1q12 for headaches and cont slow taper  -senna and colace for bowel regimen  -continue protonix for GI prophylaxis  -sodium and cholesterol restricted diet  -out of bed with assistance  -incentive spirometer for lung expansion  -pt - no needs upon discharge  -am labs    will discuss with Dr Calles  Spectra #[phone]      Assessment:  Please Check When Present   []  GCS  E   V  M     Heart Failure: []Acute, [] acute on chronic , []chronic  Heart Failure:  [] Diastolic (HFpEF), [] Systolic (HFrEF), []Combined (HFpEF and HFrEF), [] RHF, [] Pulm HTN, [] Other    [] SAY, [] ATN, [] AIN, [] other  [] CKD1, [] CKD2, [] CKD 3, [] CKD 4, [] CKD 5, []ESRD    Encephalopathy: [] Metabolic, [] Hepatic, [] toxic, [] Neurological, [] Other    Abnormal Nutritional Status: [] malnutrition (see nutrition note), [ ]underweight: BMI < 19, [] morbid obesity: BMI >40, [] Cachexia    [] Sepsis  [] hypovolemic shock,[] cardiogenic shock, [] hemorrhagic shock, [] neurogenic shock  [] Acute Respiratory Failure  []Cerebral edema, [] Brain compression/ herniation,   [] Functional quadriplegia  [] Acute blood loss anemia HPI:  Patient is a 48 year old male with hx of MI and mechanical aortic valve on coumadin with persistent headaches after a fall one month prior.  MRI was done as outpatient showing subdural hematoma.  Presented to ED afterwards and admitted to the neurosurgery service for further management.     PLAN:  -continue heparin drip, now @ 17 mL/hr, q6 hour aPTT, goal 60-80; will send INR with next lab draw and start him back on his coumadin at 10mg (takes 15mg daily M to F and 15mg sat/sun) for mechanical valve  -keppra for seizure prophylaxis  -oxycodone for pain control  -decadron 1q12 for headaches and cont slow taper  -senna and colace for bowel regimen  -continue protonix for GI prophylaxis  -sodium and cholesterol restricted diet  -out of bed with assistance  -incentive spirometer for lung expansion  -pt - no needs upon discharge  -am labs  -appreciate cardiology and hospitalist medicine consults    will discuss with Dr Calles  Spectra #[phone]      Assessment:  Please Check When Present   []  GCS  E   V  M     Heart Failure: []Acute, [] acute on chronic , []chronic  Heart Failure:  [] Diastolic (HFpEF), [] Systolic (HFrEF), []Combined (HFpEF and HFrEF), [] RHF, [] Pulm HTN, [] Other    [] SAY, [] ATN, [] AIN, [] other  [] CKD1, [] CKD2, [] CKD 3, [] CKD 4, [] CKD 5, []ESRD    Encephalopathy: [] Metabolic, [] Hepatic, [] toxic, [] Neurological, [] Other    Abnormal Nutritional Status: [] malnutrition (see nutrition note), [ ]underweight: BMI < 19, [] morbid obesity: BMI >40, [] Cachexia    [] Sepsis  [] hypovolemic shock,[] cardiogenic shock, [] hemorrhagic shock, [] neurogenic shock  [] Acute Respiratory Failure  []Cerebral edema, [] Brain compression/ herniation,   [] Functional quadriplegia  [] Acute blood loss anemia HPI:  Patient is a 48 year old male with hx of MI and mechanical aortic valve on coumadin with persistent headaches after a fall one month prior.  MRI was done as outpatient showing subdural hematoma.  Presented to ED afterwards and admitted to the neurosurgery service for further management.     PLAN:  -continue heparin drip, now @ 17 mL/hr, q6 hour aPTT, goal 60-80; will send INR with next lab draw and start him back on his coumadin at 15mg (takes 15mg daily M to F and 15mg sat/sun) for mechanical valve  -keppra for seizure prophylaxis  -oxycodone for pain control  -decadron 1q12 for headaches and cont slow taper  -senna and colace for bowel regimen  -continue protonix for GI prophylaxis  -sodium and cholesterol restricted diet  -out of bed with assistance  -incentive spirometer for lung expansion  -pt - no needs upon discharge  -am labs  -appreciate cardiology and hospitalist medicine consults    will discuss with Dr Calles  Spectra #[phone]      Assessment:  Please Check When Present   []  GCS  E   V  M     Heart Failure: []Acute, [] acute on chronic , []chronic  Heart Failure:  [] Diastolic (HFpEF), [] Systolic (HFrEF), []Combined (HFpEF and HFrEF), [] RHF, [] Pulm HTN, [] Other    [] SAY, [] ATN, [] AIN, [] other  [] CKD1, [] CKD2, [] CKD 3, [] CKD 4, [] CKD 5, []ESRD    Encephalopathy: [] Metabolic, [] Hepatic, [] toxic, [] Neurological, [] Other    Abnormal Nutritional Status: [] malnutrition (see nutrition note), [ ]underweight: BMI < 19, [] morbid obesity: BMI >40, [] Cachexia    [] Sepsis  [] hypovolemic shock,[] cardiogenic shock, [] hemorrhagic shock, [] neurogenic shock  [] Acute Respiratory Failure  []Cerebral edema, [] Brain compression/ herniation,   [] Functional quadriplegia  [] Acute blood loss anemia

## 2019-03-23 LAB
APTT BLD: 71.4 SEC — HIGH (ref 27.5–36.3)
APTT BLD: 72.9 SEC — HIGH (ref 27.5–36.3)
HCT VFR BLD CALC: 50.1 % — HIGH (ref 39–50)
HGB BLD-MCNC: 16.8 G/DL — SIGNIFICANT CHANGE UP (ref 13–17)
INR BLD: 1.12 RATIO — SIGNIFICANT CHANGE UP (ref 0.88–1.16)
MCHC RBC-ENTMCNC: 32 PG — SIGNIFICANT CHANGE UP (ref 27–34)
MCHC RBC-ENTMCNC: 33.5 GM/DL — SIGNIFICANT CHANGE UP (ref 32–36)
MCV RBC AUTO: 95.6 FL — SIGNIFICANT CHANGE UP (ref 80–100)
PLATELET # BLD AUTO: 183 K/UL — SIGNIFICANT CHANGE UP (ref 150–400)
PROTHROM AB SERPL-ACNC: 12.9 SEC — SIGNIFICANT CHANGE UP (ref 10–12.9)
RBC # BLD: 5.24 M/UL — SIGNIFICANT CHANGE UP (ref 4.2–5.8)
RBC # FLD: 13 % — SIGNIFICANT CHANGE UP (ref 10.3–14.5)
WBC # BLD: 11.4 K/UL — HIGH (ref 3.8–10.5)
WBC # FLD AUTO: 11.4 K/UL — HIGH (ref 3.8–10.5)

## 2019-03-23 PROCEDURE — 99232 SBSQ HOSP IP/OBS MODERATE 35: CPT

## 2019-03-23 PROCEDURE — 99231 SBSQ HOSP IP/OBS SF/LOW 25: CPT

## 2019-03-23 RX ORDER — POLYETHYLENE GLYCOL 3350 17 G/17G
17 POWDER, FOR SOLUTION ORAL DAILY
Qty: 0 | Refills: 0 | Status: DISCONTINUED | OUTPATIENT
Start: 2019-03-23 | End: 2019-03-26

## 2019-03-23 RX ORDER — WARFARIN SODIUM 2.5 MG/1
10 TABLET ORAL ONCE
Qty: 0 | Refills: 0 | Status: COMPLETED | OUTPATIENT
Start: 2019-03-23 | End: 2019-03-23

## 2019-03-23 RX ORDER — MULTIVIT WITH MIN/MFOLATE/K2 340-15/3 G
1 POWDER (GRAM) ORAL ONCE
Qty: 0 | Refills: 0 | Status: COMPLETED | OUTPATIENT
Start: 2019-03-23 | End: 2019-03-23

## 2019-03-23 RX ADMIN — Medication 100 MILLIGRAM(S): at 13:47

## 2019-03-23 RX ADMIN — Medication 100 MILLIGRAM(S): at 21:25

## 2019-03-23 RX ADMIN — Medication 5 MILLIGRAM(S): at 00:30

## 2019-03-23 RX ADMIN — Medication 650 MILLIGRAM(S): at 07:52

## 2019-03-23 RX ADMIN — Medication 1 MILLIGRAM(S): at 17:00

## 2019-03-23 RX ADMIN — PANTOPRAZOLE SODIUM 40 MILLIGRAM(S): 20 TABLET, DELAYED RELEASE ORAL at 05:26

## 2019-03-23 RX ADMIN — WARFARIN SODIUM 10 MILLIGRAM(S): 2.5 TABLET ORAL at 21:25

## 2019-03-23 RX ADMIN — Medication 100 MILLIGRAM(S): at 05:26

## 2019-03-23 RX ADMIN — Medication 25 MILLIGRAM(S): at 00:30

## 2019-03-23 RX ADMIN — LEVETIRACETAM 500 MILLIGRAM(S): 250 TABLET, FILM COATED ORAL at 05:25

## 2019-03-23 RX ADMIN — Medication 1 MILLIGRAM(S): at 05:25

## 2019-03-23 RX ADMIN — Medication 30 MILLILITER(S): at 09:13

## 2019-03-23 RX ADMIN — Medication 25 MILLIGRAM(S): at 23:00

## 2019-03-23 RX ADMIN — Medication 650 MILLIGRAM(S): at 08:50

## 2019-03-23 RX ADMIN — LEVETIRACETAM 500 MILLIGRAM(S): 250 TABLET, FILM COATED ORAL at 17:01

## 2019-03-23 RX ADMIN — Medication 5 MILLIGRAM(S): at 23:01

## 2019-03-23 RX ADMIN — SENNA PLUS 2 TABLET(S): 8.6 TABLET ORAL at 21:25

## 2019-03-23 NOTE — PROGRESS NOTE ADULT - ASSESSMENT
aflutter   s/p ablation     mechanical avr  hemodynamically stable   started on heparin infusion   coumadin   dose as per INR  goal 2-3  fu with neurosurgery

## 2019-03-23 NOTE — PROGRESS NOTE ADULT - SUBJECTIVE AND OBJECTIVE BOX
SUBJECTIVE: Comfortable, Sl HA and dyspepsia only    OVERNIGHT EVENTS: Dyspepsia    Vital Signs Last 24 Hrs  T(C): 36.8 (23 Mar 2019 00:31), Max: 37.1 (22 Mar 2019 11:36)  T(F): 98.3 (23 Mar 2019 00:31), Max: 98.7 (22 Mar 2019 11:36)  HR: 60 (23 Mar 2019 00:31) (58 - 65)  BP: 126/75 (23 Mar 2019 00:31) (112/65 - 137/74)  BP(mean): --  RR: 18 (23 Mar 2019 00:31) (18 - 18)  SpO2: 95% (23 Mar 2019 00:31) (95% - 95%)  IVF: [X ] IVL [ ] NS+K@   DIET: [ X] Regular-DASH [ ] CCD [ ] Renal [ ] Puree [ ] Dysphagia [ ] Tube Feeds:   PCA: [ ] YES [X ] NO   APPIAH: [ ] YES [X ] NO [ X] VOID   BM: [ ] YES [X ] NOt in past week    DRAINS: N/A    PHYSICAL EXAM:    General: No Acute Distress     Neurological: Awake, alert oriented to person, place and time, Following Commands, PERRL, EOMI, Face Symmetrical, Speech Fluent, Moving all extremities, Muscle Strength normal in all four extremities, No Drift, Sensation to Light Touch Intact    Pulmonary: Clear to Auscultation, No Rales, No Rhonchi, No Wheezes     Cardiovascular: S1, S2, Regular Rate and Rhythm     Gastrointestinal: Soft, Nontender, Nondistended     Incision: N/A    LABS:                        16.8   11.4  )-----------( 183      ( 23 Mar 2019 08:08 )             50.1    03-22    138  |  102  |  13  ----------------------------<  111<H>  4.3   |  25  |  0.80    Ca    9.8      22 Mar 2019 04:57    PT/INR - ( 23 Mar 2019 07:02 )   PT: 12.9 sec;   INR: 1.12 ratio    PTT - ( 23 Mar 2019 07:02 )  PTT:72.9 sec  Hemoglobin A1C, Whole Blood: 5.5 % (03-20 @ 08:45)    03-22 @ 07:01 - 03-23 @ 07:00  --------------------------------------------------------  IN: 1716 mL / OUT: 400 mL / NET: 1316 mL      IMAGING: < from: CT Head No Cont (03.20.19 @ 08:49) >  The right frontal parietal temporal mixed density subdural hemorrhage is   grossly stable in overall size.    MEDICATIONS  (STANDING):  dexamethasone     Tablet 1 milliGRAM(s) Oral two times a day  docusate sodium 100 milliGRAM(s) Oral three times a day  heparin  Infusion 18 mL/Hr IV Continuous <Continuous>  levETIRAcetam 500 milliGRAM(s) Oral every 12 hours  pantoprazole    Tablet 40 milliGRAM(s) Oral before breakfast  senna 2 Tablet(s) Oral at bedtime    MEDICATIONS  (PRN):  acetaminophen   Tablet .. 650 milliGRAM(s) Oral every 6 hours PRN Temp greater or equal to 38C (100.4F), Mild Pain (1 - 3)  aluminum hydroxide/magnesium hydroxide/simethicone Suspension 30 milliLiter(s) Oral every 4 hours PRN Dyspepsia  cyclobenzaprine 10 milliGRAM(s) Oral three times a day PRN Muscle Spasm  diphenhydrAMINE 25 milliGRAM(s) Oral every 4 hours PRN Insomnia  melatonin 5 milliGRAM(s) Oral at bedtime PRN Insomnia  ondansetron   Disintegrating Tablet 4 milliGRAM(s) Oral every 6 hours PRN Nausea  oxyCODONE    IR 5 milliGRAM(s) Oral every 4 hours PRN Moderate Pain (4 - 6)  oxyCODONE    IR 10 milliGRAM(s) Oral every 4 hours PRN Severe Pain (7 - 10) SUBJECTIVE: Comfortable, Sl HA and dyspepsia only    OVERNIGHT EVENTS: Dyspepsia    Vital Signs Last 24 Hrs  T(C): 36.8 (23 Mar 2019 00:31), Max: 37.1 (22 Mar 2019 11:36)  T(F): 98.3 (23 Mar 2019 00:31), Max: 98.7 (22 Mar 2019 11:36)  HR: 60 (23 Mar 2019 00:31) (58 - 65)  BP: 126/75 (23 Mar 2019 00:31) (112/65 - 137/74)  BP(mean): --  RR: 18 (23 Mar 2019 00:31) (18 - 18)  SpO2: 95% (23 Mar 2019 00:31) (95% - 95%)  IVF: [X ] IVL [ ] NS+K@   DIET: [ X] Regular-DASH [ ] CCD [ ] Renal [ ] Puree [ ] Dysphagia [ ] Tube Feeds:   PCA: [ ] YES [X ] NO   APPIAH: [ ] YES [X ] NO [ X] VOID   BM: [ ] YES [X ] NOt in past week    DRAINS: N/A    PHYSICAL EXAM:    General: No Acute Distress     Neurological: Awake, alert oriented to person, place and time, Following Commands, PERRL, EOMI, Face Symmetrical, Speech Fluent, Moving all extremities, Muscle Strength normal in all four extremities, No Drift, Sensation to Light Touch Intact    Pulmonary: Clear to Auscultation, No Rales, No Rhonchi, No Wheezes     Cardiovascular: S1, S2, Regular Rate and Rhythm     Gastrointestinal: Soft, Nontender, Nondistended     Incision: N/A    LABS:                        16.8   11.4  )-----------( 183      ( 23 Mar 2019 08:08 )             50.1    03-22    138  |  102  |  13  ----------------------------<  111<H>  4.3   |  25  |  0.80    Ca    9.8      22 Mar 2019 04:57    PT/INR - ( 23 Mar 2019 07:02 )   PT: 12.9 sec;   INR: 1.12 ratio    PTT - ( 23 Mar 2019 07:02 )  PTT:72.9 sec  Hemoglobin A1C, Whole Blood: 5.5 % (03-20 @ 08:45)    03-22 @ 07:01 - 03-23 @ 07:00  --------------------------------------------------------  IN: 1716 mL / OUT: 400 mL / NET: 1316 mL    IMAGING: < from: CT Head No Cont (03.20.19 @ 08:49) >  The right frontal parietal temporal mixed density subdural hemorrhage is   grossly stable in overall size.    MEDICATIONS  (STANDING):  dexamethasone     Tablet 1 milliGRAM(s) Oral two times a day  docusate sodium 100 milliGRAM(s) Oral three times a day  heparin  Infusion 18 mL/Hr IV Continuous <Continuous>  levETIRAcetam 500 milliGRAM(s) Oral every 12 hours  pantoprazole    Tablet 40 milliGRAM(s) Oral before breakfast  senna 2 Tablet(s) Oral at bedtime    MEDICATIONS  (PRN):  acetaminophen   Tablet .. 650 milliGRAM(s) Oral every 6 hours PRN Temp greater or equal to 38C (100.4F), Mild Pain (1 - 3)  aluminum hydroxide/magnesium hydroxide/simethicone Suspension 30 milliLiter(s) Oral every 4 hours PRN Dyspepsia  cyclobenzaprine 10 milliGRAM(s) Oral three times a day PRN Muscle Spasm  diphenhydrAMINE 25 milliGRAM(s) Oral every 4 hours PRN Insomnia  melatonin 5 milliGRAM(s) Oral at bedtime PRN Insomnia  ondansetron   Disintegrating Tablet 4 milliGRAM(s) Oral every 6 hours PRN Nausea  oxyCODONE    IR 5 milliGRAM(s) Oral every 4 hours PRN Moderate Pain (4 - 6)  oxyCODONE    IR 10 milliGRAM(s) Oral every 4 hours PRN Severe Pain (7 - 10)

## 2019-03-23 NOTE — PROGRESS NOTE ADULT - SUBJECTIVE AND OBJECTIVE BOX
He has no complaints today.    GENERAL: No fevers, no chills.  EYES: No blurry vision,  No photophobia  ENT: No sore throat.  No dysphagia  Cardiovascular: No chest pain, palpitations, orthopnea  Pulmonary: No cough, no wheezing. No shortness of breath  Gastrointestinal: No abdominal pain, no diarrhea, no constipation.  No melena.  No hematochezia  : No dysuria.  No hematuria  Musculoskeletal: No weakness.  No myalgias.  Dermatology:  No rashes.  Neuro: No Headache.  No vertigo.  No dizziness.  Psych: No anxiety, no depression.  Denies suicidal thoughts.  Hematology: No easy bruising.  No nose bleeds.  Allergy: No environmental allergies.      Vital Signs Last 24 Hrs  T(C): 37.1 (23 Mar 2019 12:33), Max: 37.1 (23 Mar 2019 08:47)  T(F): 98.7 (23 Mar 2019 12:33), Max: 98.8 (23 Mar 2019 08:47)  HR: 80 (23 Mar 2019 12:33) (58 - 80)  BP: 121/66 (23 Mar 2019 12:33) (112/65 - 138/73)  BP(mean): --  RR: 18 (23 Mar 2019 12:33) (18 - 18)  SpO2: 96% (23 Mar 2019 12:33) (95% - 96%)    GENERAL: NAD, well-developed  HEAD:  Atraumatic, Normocephalic  EYES: EOMI, PERRLA, conjunctiva and sclera clear  ENT: Pharynx not erythematous  PULMONARY: Clear to auscultation bilaterally; No wheeze  CARDIOVASCULAR: Regular rate and rhythm; No murmurs, rubs, or gallops  ABDOMEN: Soft, Nontender, Nondistended; Bowel sounds present  EXTREMITIES:  2+ Peripheral Pulses, No clubbing, cyanosis, or edema  MUSCULOSKELETAL: No calf tenderness  PSYCH: AAOx3, normal affect  NEUROLOGY: CN2-12 grossly intact, no gross focal sensory or motor deficits   SKIN: warm and dry, No rashes or lesions

## 2019-03-23 NOTE — PROGRESS NOTE ADULT - ATTENDING COMMENTS
dvt ppx: c/w heparin bridge to coumadin dvt ppx: c/w heparin bridge to coumadin    medicine will sign off, please reconsult if necessary.

## 2019-03-23 NOTE — PROGRESS NOTE ADULT - ASSESSMENT
HPI: Lenny Person, 48M significant cardiac hx s/p mechanical valve on Coumadin got outpatient MRI for continued headaches after fall 1 month ago showing small R acute on chronic SDH stable on 4h CTH done here in ER. Intact on exam except mild headache and debatable LUE VERY subtle drift. (13 Mar 2019 19:40)    PROCEDURE:  Adm 3/13 Rt Frontal Acute on Chronic SDH-Non surgical  POD#n/a    PLAN:  Neuro: AVR-on Heparin gtt 18ml/hr, PTT=72.9 (goal 60-90, IND 1.12 (goal=2-3, Give Coumadin 10mg HS x 1. FU PTT/INR AM.  Inc activity/OOB.     Cardio-    Medicine-    Respiratory: Patient instructed to use incentive spirometer [ X] YES [ ] NO              DVT ppx: [X ] SQL [ ] SQH and Venodynes [ ] Left [ ] Right [ X] Bilateral    Discharge Planning:  The patient was evaluated by PT and recommended out patient PT/A. Rehab/S. Acute Rehab.   She/He was subsequently DC on /2018 in stable condition.    More than 30 minutes spent on total encounter: more than 50% of the visit was spent on educating the patient and family regarding condition, medications, follow up plans, signs and symptoms to be concerned with, preparing paperwork, and questions answered regarding discharge.      Assessment:  Please Check When Present   []  GCS  E   V  M     Heart Failure: []Acute, [] acute on chronic , []chronic  Heart Failure:  [] Diastolic (HFpEF), [] Systolic (HFrEF), []Combined (HFpEF and HFrEF), [] RHF, [] Pulm HTN, [] Other    [] SAY, [] ATN, [] AIN, [] other  [] CKD1, [] CKD2, [] CKD 3, [] CKD 4, [] CKD 5, []ESRD    Encephalopathy: [] Metabolic, [] Hepatic, [] toxic, [] Neurological, [] Other    Abnormal Nurtitional Status: [] malnurtition (see nutrition note), [ ]underweight: BMI < 19, [] morbid obesity: BMI >40, [] Cachexia    [] Sepsis  [] hypovolemic shock,[] cardiogenic shock, [] hemorrhagic shock, [] neuogenic shock  [] Acute Respiratory Failure  []Cerebral edema, [] Brain compression/ herniation,   [] Functional quadriplegia  [] Acute blood loss anemia HPI: Lenny Person, 48M significant cardiac hx s/p mechanical valve on Coumadin got outpatient MRI for continued headaches after fall 1 month ago showing small R acute on chronic SDH stable on 4h CTH done here in ER. Intact on exam except mild headache and debatable LUE VERY subtle drift. (13 Mar 2019 19:40)    PROCEDURE:  Adm 3/13 Rt Frontal Acute on Chronic SDH-Non surgical  POD#n/a    PLAN:  Neuro: AVR-on Heparin gtt 18ml/hr, PTT=72.9 (goal 60-90, INR 1.12 (goal=2-3), Give Coumadin 10mg HS x 1. FU PTT/INR AM.  Leukocytosis trending down. Bowel regimen FU. Inc activity/OOB. DC Plan once INR Therapeutic.    Cardio/Dr AndersonYtmnfu-ywwgsddj-l/p ablation. mechanical avr.  hemodynamically stable. started on heparin infusion and Coumadin    Medicine/Hosp-Problem: SDH (subdural hematoma).  Plan: clinically stable. stable head CT. monitor on Hep gtt. Repeat CT head while therapeutic PTT per neurosurg.  Problem: Bradycardia.  Plan: improved on lower Metoprolol. monitor. Problem: History of mechanical aortic valve replacement.  Plan: on Hep gtt. Start Coumadin home dose of 15 mg x1. Monitor INR. Problem: Atrial flutter.  Plan: cont Metoprolol. AC.     Respiratory: Patient instructed to use incentive spirometer [ ] YES [ X] NO              DVT ppx: [X ] Heparin gtt + Coumadin [ ] SQH and Venodynes [ ] Left [ ] Right [ X] Bilateral    Discharge Planning:  The patient was evaluated by PT and recommended No PT needs.       Assessment:  Please Check When Present   []  GCS  E   V  M     Heart Failure: []Acute, [] acute on chronic , []chronic  Heart Failure:  [] Diastolic (HFpEF), [] Systolic (HFrEF), []Combined (HFpEF and HFrEF), [] RHF, [] Pulm HTN, [] Other    [] SAY, [] ATN, [] AIN, [] other  [] CKD1, [] CKD2, [] CKD 3, [] CKD 4, [] CKD 5, []ESRD    Encephalopathy: [] Metabolic, [] Hepatic, [] toxic, [] Neurological, [] Other    Abnormal Nurtitional Status: [] malnurtition (see nutrition note), [ ]underweight: BMI < 19, [] morbid obesity: BMI >40, [] Cachexia    [] Sepsis  [] hypovolemic shock,[] cardiogenic shock, [] hemorrhagic shock, [] neuogenic shock  [] Acute Respiratory Failure  []Cerebral edema, [] Brain compression/ herniation,   [] Functional quadriplegia  [] Acute blood loss anemia

## 2019-03-23 NOTE — PROGRESS NOTE ADULT - SUBJECTIVE AND OBJECTIVE BOX
Subjective: Patient seen and examined. No new events except as noted.     SUBJECTIVE/ROS:  feels ok       MEDICATIONS:  MEDICATIONS  (STANDING):  dexamethasone     Tablet 1 milliGRAM(s) Oral two times a day  docusate sodium 100 milliGRAM(s) Oral three times a day  heparin  Infusion 1700 Unit(s)/Hr (18 mL/Hr) IV Continuous <Continuous>  levETIRAcetam 500 milliGRAM(s) Oral every 12 hours  magnesium citrate Oral Solution 1 Bottle Oral once  pantoprazole    Tablet 40 milliGRAM(s) Oral before breakfast  polyethylene glycol 3350 17 Gram(s) Oral daily  senna 2 Tablet(s) Oral at bedtime  warfarin 10 milliGRAM(s) Oral once      PHYSICAL EXAM:  T(C): 37.1 (03-23-19 @ 08:47), Max: 37.1 (03-22-19 @ 11:36)  HR: 60 (03-23-19 @ 08:47) (58 - 65)  BP: 138/73 (03-23-19 @ 08:47) (112/65 - 138/73)  RR: 18 (03-23-19 @ 08:47) (18 - 18)  SpO2: 95% (03-23-19 @ 08:47) (95% - 95%)  Wt(kg): --  I&O's Summary    22 Mar 2019 07:01  -  23 Mar 2019 07:00  --------------------------------------------------------  IN: 1716 mL / OUT: 400 mL / NET: 1316 mL            JVP: Normal  Neck: supple  Lung: clear   CV: S1 S2 , Murmur:  Abd: soft  Ext: No edema  neuro: Awake / alert  Psych: flat affect  Skin: normal``    LABS/DATA:    CARDIAC MARKERS:                                16.8   11.4  )-----------( 183      ( 23 Mar 2019 08:08 )             50.1     03-22    138  |  102  |  13  ----------------------------<  111<H>  4.3   |  25  |  0.80    Ca    9.8      22 Mar 2019 04:57      proBNP:   Lipid Profile:   HgA1c:   TSH:     TELE:  EKG:

## 2019-03-24 LAB
APTT BLD: 62.9 SEC — HIGH (ref 27.5–36.3)
INR BLD: 1.39 RATIO — HIGH (ref 0.88–1.16)
PROTHROM AB SERPL-ACNC: 16.1 SEC — HIGH (ref 10–13.1)

## 2019-03-24 PROCEDURE — 99231 SBSQ HOSP IP/OBS SF/LOW 25: CPT

## 2019-03-24 RX ORDER — ZOLPIDEM TARTRATE 10 MG/1
5 TABLET ORAL AT BEDTIME
Qty: 0 | Refills: 0 | Status: DISCONTINUED | OUTPATIENT
Start: 2019-03-24 | End: 2019-03-26

## 2019-03-24 RX ORDER — WARFARIN SODIUM 2.5 MG/1
15 TABLET ORAL
Qty: 0 | Refills: 0 | Status: DISCONTINUED | OUTPATIENT
Start: 2019-03-24 | End: 2019-03-25

## 2019-03-24 RX ADMIN — WARFARIN SODIUM 15 MILLIGRAM(S): 2.5 TABLET ORAL at 20:13

## 2019-03-24 RX ADMIN — ZOLPIDEM TARTRATE 5 MILLIGRAM(S): 10 TABLET ORAL at 22:21

## 2019-03-24 RX ADMIN — HEPARIN SODIUM 18 UNIT(S)/HR: 5000 INJECTION INTRAVENOUS; SUBCUTANEOUS at 09:31

## 2019-03-24 RX ADMIN — Medication 100 MILLIGRAM(S): at 05:12

## 2019-03-24 RX ADMIN — Medication 100 MILLIGRAM(S): at 20:13

## 2019-03-24 RX ADMIN — LEVETIRACETAM 500 MILLIGRAM(S): 250 TABLET, FILM COATED ORAL at 05:12

## 2019-03-24 RX ADMIN — LEVETIRACETAM 500 MILLIGRAM(S): 250 TABLET, FILM COATED ORAL at 18:07

## 2019-03-24 RX ADMIN — SENNA PLUS 2 TABLET(S): 8.6 TABLET ORAL at 20:13

## 2019-03-24 RX ADMIN — PANTOPRAZOLE SODIUM 40 MILLIGRAM(S): 20 TABLET, DELAYED RELEASE ORAL at 05:12

## 2019-03-24 RX ADMIN — Medication 1 MILLIGRAM(S): at 05:12

## 2019-03-24 RX ADMIN — Medication 1 MILLIGRAM(S): at 18:07

## 2019-03-24 NOTE — PROGRESS NOTE ADULT - ASSESSMENT
HPI:  Patient is a 48 year old male with hx of MI and mechanical aortic valve on coumadin with persistent headaches after a fall one month prior.  MRI was done as outpatient showing subdural hematoma.  Presented to ED afterwards and admitted to the neurosurgery service for further management.     PLAN:  -continue heparin drip, now @ 18 mL/hr, aPTT, goal 60-80; INR 1.39, goal 2-3; will send daily am INR and bridge to coumadin; 15mg x 1 tonight (takes 15mg daily M to F and 15mg sat/sun) for mechanical aortic valve; will rpt CTh in next 24hr/prior to d/c  -ambien 5mg x 1 tonight for sleep and protected sleep time given agitation/ sleep disturbance  -keppra for seizure prophylaxis  -oxycodone for pain control  -decadron 1q12 for headaches and cont slow taper to off  -senna and colace for bowel regimen  -continue protonix for GI prophylaxis  -sodium and cholesterol restricted diet  -out of bed with assistance  -incentive spirometer for lung expansion  -pt - no needs upon discharge  -am labs  -appreciate cardiology and hospitalist medicine consults    will discuss with Dr Calles  Spectra #[phone]      Assessment:  Please Check When Present   []  GCS  E   V  M     Heart Failure: []Acute, [] acute on chronic , []chronic  Heart Failure:  [] Diastolic (HFpEF), [] Systolic (HFrEF), []Combined (HFpEF and HFrEF), [] RHF, [] Pulm HTN, [] Other    [] SAY, [] ATN, [] AIN, [] other  [] CKD1, [] CKD2, [] CKD 3, [] CKD 4, [] CKD 5, []ESRD    Encephalopathy: [] Metabolic, [] Hepatic, [] toxic, [] Neurological, [] Other    Abnormal Nutritional Status: [] malnutrition (see nutrition note), [ ]underweight: BMI < 19, [] morbid obesity: BMI >40, [] Cachexia    [] Sepsis  [] hypovolemic shock,[] cardiogenic shock, [] hemorrhagic shock, [] neurogenic shock  [] Acute Respiratory Failure  []Cerebral edema, [] Brain compression/ herniation,   [] Functional quadriplegia  [] Acute blood loss anemia HPI:  Patient is a 48 year old male with hx of MI and mechanical aortic valve on coumadin with persistent headaches after a fall one month prior.  MRI was done as outpatient showing subdural hematoma.  Presented to ED afterwards and admitted to the neurosurgery service for further management.     PLAN:  -continue heparin drip, now @ 18 mL/hr, aPTT, goal 60-80; INR 1.39, goal 2-3; will send daily am INR and bridge to coumadin; 15mg x 1 tonight (takes 15mg daily M to F and 15mg sat/sun) for mechanical aortic valve; will rpt CTh in next 24hr/prior to d/c; no surgical intervention for SDH  -ambien 5mg x 1 tonight for sleep and protected sleep time given agitation/ sleep disturbance  -keppra for seizure prophylaxis  -oxycodone for pain control  -decadron 1q12 for headaches and cont slow taper to off  -senna and colace for bowel regimen  -continue protonix for GI prophylaxis  -sodium and cholesterol restricted diet  -out of bed with assistance  -incentive spirometer for lung expansion  -pt - no needs upon discharge  -am labs  -appreciate cardiology and hospitalist medicine consults    will discuss with Dr Calles  Spectra #[phone]      Assessment:  Please Check When Present   []  GCS  E   V  M     Heart Failure: []Acute, [] acute on chronic , []chronic  Heart Failure:  [] Diastolic (HFpEF), [] Systolic (HFrEF), []Combined (HFpEF and HFrEF), [] RHF, [] Pulm HTN, [] Other    [] SAY, [] ATN, [] AIN, [] other  [] CKD1, [] CKD2, [] CKD 3, [] CKD 4, [] CKD 5, []ESRD    Encephalopathy: [] Metabolic, [] Hepatic, [] toxic, [] Neurological, [] Other    Abnormal Nutritional Status: [] malnutrition (see nutrition note), [ ]underweight: BMI < 19, [] morbid obesity: BMI >40, [] Cachexia    [] Sepsis  [] hypovolemic shock,[] cardiogenic shock, [] hemorrhagic shock, [] neurogenic shock  [] Acute Respiratory Failure  []Cerebral edema, [] Brain compression/ herniation,   [] Functional quadriplegia  [] Acute blood loss anemia

## 2019-03-24 NOTE — PROGRESS NOTE ADULT - SUBJECTIVE AND OBJECTIVE BOX
Patient is a 48 year old male with hx of MI and mechanical aortic valve on coumadin with persistent headaches after a fall one month prior.  MRI was done as outpatient showing subdural hematoma.  Presented to ED afterwards and admitted to the neurosurgery service for further management.      OVERNIGHT EVENTS:  No acute events overnight.  Denies any headaches. c/o not being able to sleep    Vital Signs Last 24 Hrs  T(C): 36.6 (24 Mar 2019 13:19), Max: 36.9 (23 Mar 2019 19:16)  T(F): 97.9 (24 Mar 2019 13:19), Max: 98.4 (23 Mar 2019 19:16)  HR: 75 (24 Mar 2019 13:19) (54 - 79)  BP: 134/91 (24 Mar 2019 13:19) (111/71 - 134/91)  BP(mean): --  RR: 18 (24 Mar 2019 13:19) (18 - 18)  SpO2: 97% (24 Mar 2019 13:19) (95% - 97%)    PHYSICAL EXAM:  Neurological: awake, alert, oriented x3, follows commands, speech clear and fluent, perrla bilat, eomi, face symmetric, tongue midline, no drift b/l, moves all extremities x4 w/ 5/5 strength throughout, sensation present, intact, equal throughout; ambulating independently  Cardiovascular: +s1, s2  Respiratory: clear to auscultation b/l  Gastrointestinal: soft, non-distended, non-tender  Genitourinary: +voiding    LABS:                             16.8   11.4  )-----------( 183      ( 23 Mar 2019 08:08 )             50.1   PT/INR - ( 24 Mar 2019 08:49 )   PT: 16.1 sec;   INR: 1.39 ratio    PTT - ( 24 Mar 2019 08:49 )  PTT:62.9 sec    MEDICATIONS  (STANDING):  dexamethasone     Tablet 1 milliGRAM(s) Oral two times a day  docusate sodium 100 milliGRAM(s) Oral three times a day  heparin  Infusion 1800 Unit(s)/Hr (18 mL/Hr) IV Continuous <Continuous>  levETIRAcetam 500 milliGRAM(s) Oral every 12 hours  pantoprazole    Tablet 40 milliGRAM(s) Oral before breakfast  senna 2 Tablet(s) Oral at bedtime  warfarin 10 milliGRAM(s) Oral <User Schedule>    MEDICATIONS  (PRN):  acetaminophen   Tablet .. 650 milliGRAM(s) Oral every 6 hours PRN Temp greater or equal to 38C (100.4F), Mild Pain (1 - 3)  cyclobenzaprine 10 milliGRAM(s) Oral three times a day PRN Muscle Spasm  diphenhydrAMINE 25 milliGRAM(s) Oral every 4 hours PRN Insomnia  ondansetron   Disintegrating Tablet 4 milliGRAM(s) Oral every 6 hours PRN Nausea  oxyCODONE    IR 5 milliGRAM(s) Oral every 4 hours PRN Moderate Pain (4 - 6)  oxyCODONE    IR 10 milliGRAM(s) Oral every 4 hours PRN Severe Pain (7 - 10)    IMAGING:  < from: CT Head No Cont (03.20.19 @ 08:49) >  INTERPRETATION:  History: Intracranial hemorrhage follow-up. Follow-up   subdural hematoma.    Description: A noncontrast head CT is compared to prior CT study from   03/14/2019.    Right frontal parietal temporal mixed density subdural hemorrhage is   grossly stable in size. There has been a mild interval decrease in the   density of the hemorrhage over the time interval, consistent with   appropriate evolution of hemorrhagic products. Minimal interhemispheric   and tentorial subdural hemorrhage is unchanged. Minimal right to left   shift is stable.    There is no evidence for acute infarct or brain parenchymal hemorrhage.    Mild mucosal thickening involves right maxillary sinus.    IMPRESSION:    The right frontal parietal temporal mixed density subdural hemorrhage is   grossly stable in overall size.        < end of copied text >

## 2019-03-24 NOTE — PROGRESS NOTE ADULT - SUBJECTIVE AND OBJECTIVE BOX
Subjective: Patient seen and examined. No new events except as noted.     SUBJECTIVE/ROS:  No chest pain, dyspnea, palpitation, or dizziness.       MEDICATIONS:  MEDICATIONS  (STANDING):  dexamethasone     Tablet 1 milliGRAM(s) Oral two times a day  docusate sodium 100 milliGRAM(s) Oral three times a day  heparin  Infusion 1700 Unit(s)/Hr (18 mL/Hr) IV Continuous <Continuous>  levETIRAcetam 500 milliGRAM(s) Oral every 12 hours  pantoprazole    Tablet 40 milliGRAM(s) Oral before breakfast  polyethylene glycol 3350 17 Gram(s) Oral daily  senna 2 Tablet(s) Oral at bedtime  warfarin 15 milliGRAM(s) Oral <User Schedule>      PHYSICAL EXAM:  T(C): 36.7 (03-24-19 @ 09:06), Max: 37.1 (03-23-19 @ 12:33)  HR: 54 (03-24-19 @ 09:06) (54 - 80)  BP: 128/87 (03-24-19 @ 09:06) (111/71 - 128/87)  RR: 18 (03-24-19 @ 09:06) (18 - 18)  SpO2: 97% (03-24-19 @ 09:06) (95% - 97%)  Wt(kg): --  I&O's Summary    23 Mar 2019 07:01  -  24 Mar 2019 07:00  --------------------------------------------------------  IN: 2684 mL / OUT: 0 mL / NET: 2684 mL            JVP: Normal  Neck: supple  Lung: clear   CV: S1 S2 , Murmur:  Abd: soft  Ext: No edema  neuro: Awake / alert  Psych: flat affect  Skin: normal``    LABS/DATA:    CARDIAC MARKERS:                                16.8   11.4  )-----------( 183      ( 23 Mar 2019 08:08 )             50.1           proBNP:   Lipid Profile:   HgA1c:   TSH:     TELE:  EKG:

## 2019-03-24 NOTE — PROGRESS NOTE ADULT - ASSESSMENT
aflutter   s/p ablation     mechanical avr  hemodynamically stable   on heparin infusion   coumadin   dose as per INR  goal 2-3  fu with neurosurgery

## 2019-03-25 LAB
APTT BLD: 105.8 SEC — HIGH (ref 27.5–36.3)
APTT BLD: 66.6 SEC — HIGH (ref 27.5–36.3)
APTT BLD: 89.8 SEC — HIGH (ref 27.5–36.3)
HCT VFR BLD CALC: 50.9 % — HIGH (ref 39–50)
HGB BLD-MCNC: 17.6 G/DL — HIGH (ref 13–17)
INR BLD: 1.66 RATIO — HIGH (ref 0.88–1.16)
INR BLD: 1.8 RATIO — HIGH (ref 0.88–1.16)
MCHC RBC-ENTMCNC: 33.3 PG — SIGNIFICANT CHANGE UP (ref 27–34)
MCHC RBC-ENTMCNC: 34.6 GM/DL — SIGNIFICANT CHANGE UP (ref 32–36)
MCV RBC AUTO: 96.2 FL — SIGNIFICANT CHANGE UP (ref 80–100)
PLATELET # BLD AUTO: 185 K/UL — SIGNIFICANT CHANGE UP (ref 150–400)
PROTHROM AB SERPL-ACNC: 19.4 SEC — HIGH (ref 10–12.9)
PROTHROM AB SERPL-ACNC: 20.9 SEC — HIGH (ref 10–12.9)
RBC # BLD: 5.29 M/UL — SIGNIFICANT CHANGE UP (ref 4.2–5.8)
RBC # FLD: 13 % — SIGNIFICANT CHANGE UP (ref 10.3–14.5)
WBC # BLD: 11.4 K/UL — HIGH (ref 3.8–10.5)
WBC # FLD AUTO: 11.4 K/UL — HIGH (ref 3.8–10.5)

## 2019-03-25 PROCEDURE — 99231 SBSQ HOSP IP/OBS SF/LOW 25: CPT

## 2019-03-25 RX ORDER — OXYCODONE HYDROCHLORIDE 5 MG/1
10 TABLET ORAL EVERY 4 HOURS
Qty: 0 | Refills: 0 | Status: DISCONTINUED | OUTPATIENT
Start: 2019-03-25 | End: 2019-03-26

## 2019-03-25 RX ORDER — WARFARIN SODIUM 2.5 MG/1
15 TABLET ORAL
Qty: 0 | Refills: 0 | Status: COMPLETED | OUTPATIENT
Start: 2019-03-25 | End: 2019-03-25

## 2019-03-25 RX ORDER — LANOLIN ALCOHOL/MO/W.PET/CERES
5 CREAM (GRAM) TOPICAL AT BEDTIME
Qty: 0 | Refills: 0 | Status: DISCONTINUED | OUTPATIENT
Start: 2019-03-25 | End: 2019-03-26

## 2019-03-25 RX ORDER — OXYCODONE HYDROCHLORIDE 5 MG/1
5 TABLET ORAL EVERY 4 HOURS
Qty: 0 | Refills: 0 | Status: DISCONTINUED | OUTPATIENT
Start: 2019-03-25 | End: 2019-03-26

## 2019-03-25 RX ADMIN — HEPARIN SODIUM 17 UNIT(S)/HR: 5000 INJECTION INTRAVENOUS; SUBCUTANEOUS at 10:12

## 2019-03-25 RX ADMIN — Medication 5 MILLIGRAM(S): at 21:26

## 2019-03-25 RX ADMIN — HEPARIN SODIUM 17 UNIT(S)/HR: 5000 INJECTION INTRAVENOUS; SUBCUTANEOUS at 05:53

## 2019-03-25 RX ADMIN — LEVETIRACETAM 500 MILLIGRAM(S): 250 TABLET, FILM COATED ORAL at 05:07

## 2019-03-25 RX ADMIN — Medication 100 MILLIGRAM(S): at 05:06

## 2019-03-25 RX ADMIN — LEVETIRACETAM 500 MILLIGRAM(S): 250 TABLET, FILM COATED ORAL at 17:16

## 2019-03-25 RX ADMIN — SENNA PLUS 2 TABLET(S): 8.6 TABLET ORAL at 21:26

## 2019-03-25 RX ADMIN — Medication 100 MILLIGRAM(S): at 21:26

## 2019-03-25 RX ADMIN — ZOLPIDEM TARTRATE 5 MILLIGRAM(S): 10 TABLET ORAL at 21:26

## 2019-03-25 RX ADMIN — WARFARIN SODIUM 15 MILLIGRAM(S): 2.5 TABLET ORAL at 21:26

## 2019-03-25 RX ADMIN — Medication 1 MILLIGRAM(S): at 05:06

## 2019-03-25 NOTE — PROGRESS NOTE ADULT - SUBJECTIVE AND OBJECTIVE BOX
Patient is a 48 year old male with hx of MI and mechanical aortic valve on coumadin with persistent headaches after a fall one month prior.  MRI was done as outpatient showing subdural hematoma.  Presented to ED afterwards and admitted to the neurosurgery service for further management.      OVERNIGHT EVENTS:  No acute events overnight.  Denies any headaches. c/o not being able to sleep    Vital Signs Last 24 Hrs  T(C): 37 (25 Mar 2019 08:04), Max: 37 (25 Mar 2019 08:04)  T(F): 98.6 (25 Mar 2019 08:04), Max: 98.6 (25 Mar 2019 08:04)  HR: 56 (25 Mar 2019 08:04) (56 - 75)  BP: 126/76 (25 Mar 2019 08:04) (106/56 - 134/91)  BP(mean): --  RR: 18 (25 Mar 2019 08:04) (18 - 18)  SpO2: 99% (25 Mar 2019 08:04) (95% - 99%)    PHYSICAL EXAM:  Neurological: awake, alert, oriented x3, follows commands, speech clear and fluent, perrla bilat, eomi, face symmetric, tongue midline, no drift b/l, moves all extremities x4 w/ 5/5 strength throughout, sensation present, intact, equal throughout; ambulating independently  Cardiovascular: +s1, s2  Respiratory: clear to auscultation b/l  Gastrointestinal: soft, non-distended, non-tender  Genitourinary: +voiding    LABS:                                   17.6   11.4  )-----------( 185      ( 25 Mar 2019 05:10 )             50.9   PT/INR - ( 25 Mar 2019 05:10 )   PT: 19.4 sec;   INR: 1.66 ratio      PTT - ( 25 Mar 2019 05:10 )  PTT:89.8 sec    MEDICATIONS  (STANDING):  docusate sodium 100 milliGRAM(s) Oral three times a day  heparin  Infusion 1700 Unit(s)/Hr (17 mL/Hr) IV Continuous <Continuous>  levETIRAcetam 500 milliGRAM(s) Oral every 12 hours  melatonin 5 milliGRAM(s) Oral at bedtime  pantoprazole    Tablet 40 milliGRAM(s) Oral before breakfast  polyethylene glycol 3350 17 Gram(s) Oral daily  senna 2 Tablet(s) Oral at bedtime  warfarin 15 milliGRAM(s) Oral <User Schedule>    MEDICATIONS  (PRN):  acetaminophen   Tablet .. 650 milliGRAM(s) Oral every 6 hours PRN Temp greater or equal to 38C (100.4F), Mild Pain (1 - 3)  aluminum hydroxide/magnesium hydroxide/simethicone Suspension 30 milliLiter(s) Oral every 4 hours PRN Dyspepsia  bisacodyl Suppository 10 milliGRAM(s) Rectal daily PRN Constipation  cyclobenzaprine 10 milliGRAM(s) Oral three times a day PRN Muscle Spasm  diphenhydrAMINE 25 milliGRAM(s) Oral every 4 hours PRN Insomnia  ondansetron   Disintegrating Tablet 4 milliGRAM(s) Oral every 6 hours PRN Nausea  oxyCODONE    IR 5 milliGRAM(s) Oral every 4 hours PRN Moderate Pain (4 - 6)  oxyCODONE    IR 10 milliGRAM(s) Oral every 4 hours PRN Severe Pain (7 - 10)  zolpidem 5 milliGRAM(s) Oral at bedtime PRN Insomnia    IMAGING:  < from: CT Head No Cont (03.20.19 @ 08:49) >  INTERPRETATION:  History: Intracranial hemorrhage follow-up. Follow-up   subdural hematoma.    Description: A noncontrast head CT is compared to prior CT study from   03/14/2019.    Right frontal parietal temporal mixed density subdural hemorrhage is   grossly stable in size. There has been a mild interval decrease in the   density of the hemorrhage over the time interval, consistent with   appropriate evolution of hemorrhagic products. Minimal interhemispheric   and tentorial subdural hemorrhage is unchanged. Minimal right to left   shift is stable.    There is no evidence for acute infarct or brain parenchymal hemorrhage.    Mild mucosal thickening involves right maxillary sinus.    IMPRESSION:    The right frontal parietal temporal mixed density subdural hemorrhage is   grossly stable in overall size.        < end of copied text >

## 2019-03-25 NOTE — PROGRESS NOTE ADULT - SUBJECTIVE AND OBJECTIVE BOX
Subjective: Patient seen and examined. No new events except as noted.     SUBJECTIVE/ROS:  feels ok       MEDICATIONS:  MEDICATIONS  (STANDING):  dexamethasone     Tablet 1 milliGRAM(s) Oral two times a day  docusate sodium 100 milliGRAM(s) Oral three times a day  heparin  Infusion 1700 Unit(s)/Hr (17 mL/Hr) IV Continuous <Continuous>  levETIRAcetam 500 milliGRAM(s) Oral every 12 hours  pantoprazole    Tablet 40 milliGRAM(s) Oral before breakfast  polyethylene glycol 3350 17 Gram(s) Oral daily  senna 2 Tablet(s) Oral at bedtime      PHYSICAL EXAM:  T(C): 36.8 (03-25-19 @ 04:27), Max: 36.9 (03-24-19 @ 23:25)  HR: 57 (03-25-19 @ 04:27) (54 - 75)  BP: 126/77 (03-25-19 @ 04:27) (106/56 - 134/91)  RR: 18 (03-25-19 @ 04:27) (18 - 18)  SpO2: 96% (03-25-19 @ 04:27) (95% - 97%)  Wt(kg): --  I&O's Summary    24 Mar 2019 07:01  -  25 Mar 2019 07:00  --------------------------------------------------------  IN: 1445 mL / OUT: 0 mL / NET: 1445 mL            JVP: Normal  Neck: supple  Lung: clear   CV: S1 S2 , Murmur:  Abd: soft  Ext: No edema  neuro: Awake  Psych: flat affect  Skin: normal``    LABS/DATA:    CARDIAC MARKERS:                                17.6   11.4  )-----------( 185      ( 25 Mar 2019 05:10 )             50.9           proBNP:   Lipid Profile:   HgA1c:   TSH:     TELE:  EKG:

## 2019-03-25 NOTE — PROGRESS NOTE ADULT - ASSESSMENT
aflutter   s/p ablation     mechanical avr  hemodynamically stable   on heparin infusion with coumadin  INR 1.66 today, goal 2 - 3

## 2019-03-25 NOTE — PROGRESS NOTE ADULT - ASSESSMENT
HPI:  Patient is a 48 year old male with hx of MI and mechanical aortic valve on coumadin with persistent headaches after a fall one month prior.  MRI was done as outpatient showing subdural hematoma.  Presented to ED afterwards and admitted to the neurosurgery service for further management.     PLAN:  -continue heparin drip, now @ 17 mL/hr, aPTT, goal 60-80; INR 1.66, goal 2-3; will send daily am INR and bridge to coumadin; 15mg x 1 tonight (takes 15mg daily M to F and 15mg sat/sun) for mechanical aortic valve;   will rpt CTH prior to d/c; no surgical intervention for SDH  -ambien 5mg x 1 tonight for sleep and protected sleep time given agitation/ sleep disturbance  -keppra for seizure prophylaxis  -oxycodone for pain control  -decadron stopped today   -senna and colace for bowel regimen  -continue protonix for GI prophylaxis  -sodium and cholesterol restricted diet  -out of bed with assistance  -incentive spirometer for lung expansion  -pt - no needs upon discharge  -am labs  -appreciate cardiology and hospitalist medicine consults    will discuss with Dr Calles  Spectra #[phone]    Assessment:  Please Check When Present   []  GCS  E   V  M     Heart Failure: []Acute, [] acute on chronic , []chronic  Heart Failure:  [] Diastolic (HFpEF), [] Systolic (HFrEF), []Combined (HFpEF and HFrEF), [] RHF, [] Pulm HTN, [] Other    [] SAY, [] ATN, [] AIN, [] other  [] CKD1, [] CKD2, [] CKD 3, [] CKD 4, [] CKD 5, []ESRD    Encephalopathy: [] Metabolic, [] Hepatic, [] toxic, [] Neurological, [] Other    Abnormal Nutritional Status: [] malnutrition (see nutrition note), [ ]underweight: BMI < 19, [] morbid obesity: BMI >40, [] Cachexia    [] Sepsis  [] hypovolemic shock,[] cardiogenic shock, [] hemorrhagic shock, [] neurogenic shock  [] Acute Respiratory Failure  []Cerebral edema, [] Brain compression/ herniation,   [] Functional quadriplegia  [] Acute blood loss anemia

## 2019-03-26 ENCOUNTER — TRANSCRIPTION ENCOUNTER (OUTPATIENT)
Age: 49
End: 2019-03-26

## 2019-03-26 VITALS
RESPIRATION RATE: 18 BRPM | OXYGEN SATURATION: 96 % | SYSTOLIC BLOOD PRESSURE: 137 MMHG | TEMPERATURE: 98 F | HEART RATE: 65 BPM | DIASTOLIC BLOOD PRESSURE: 87 MMHG

## 2019-03-26 LAB
APTT BLD: 52.3 SEC — HIGH (ref 27.5–36.3)
HCT VFR BLD CALC: 50 % — SIGNIFICANT CHANGE UP (ref 39–50)
HGB BLD-MCNC: 16.8 G/DL — SIGNIFICANT CHANGE UP (ref 13–17)
INR BLD: 2.01 RATIO — HIGH (ref 0.88–1.16)
MCHC RBC-ENTMCNC: 31.4 PG — SIGNIFICANT CHANGE UP (ref 27–34)
MCHC RBC-ENTMCNC: 33.6 GM/DL — SIGNIFICANT CHANGE UP (ref 32–36)
MCV RBC AUTO: 93.5 FL — SIGNIFICANT CHANGE UP (ref 80–100)
PLATELET # BLD AUTO: 187 K/UL — SIGNIFICANT CHANGE UP (ref 150–400)
PROTHROM AB SERPL-ACNC: 23.2 SEC — HIGH (ref 10–13.1)
RBC # BLD: 5.35 M/UL — SIGNIFICANT CHANGE UP (ref 4.2–5.8)
RBC # FLD: 13.2 % — SIGNIFICANT CHANGE UP (ref 10.3–14.5)
WBC # BLD: 10.01 K/UL — SIGNIFICANT CHANGE UP (ref 3.8–10.5)
WBC # FLD AUTO: 10.01 K/UL — SIGNIFICANT CHANGE UP (ref 3.8–10.5)

## 2019-03-26 PROCEDURE — 70450 CT HEAD/BRAIN W/O DYE: CPT

## 2019-03-26 PROCEDURE — 85027 COMPLETE CBC AUTOMATED: CPT

## 2019-03-26 PROCEDURE — 86901 BLOOD TYPING SEROLOGIC RH(D): CPT

## 2019-03-26 PROCEDURE — 99233 SBSQ HOSP IP/OBS HIGH 50: CPT

## 2019-03-26 PROCEDURE — 93306 TTE W/DOPPLER COMPLETE: CPT

## 2019-03-26 PROCEDURE — 99285 EMERGENCY DEPT VISIT HI MDM: CPT | Mod: 25

## 2019-03-26 PROCEDURE — 70450 CT HEAD/BRAIN W/O DYE: CPT | Mod: 26

## 2019-03-26 PROCEDURE — 85730 THROMBOPLASTIN TIME PARTIAL: CPT

## 2019-03-26 PROCEDURE — 99239 HOSP IP/OBS DSCHRG MGMT >30: CPT

## 2019-03-26 PROCEDURE — 83036 HEMOGLOBIN GLYCOSYLATED A1C: CPT

## 2019-03-26 PROCEDURE — 97161 PT EVAL LOW COMPLEX 20 MIN: CPT

## 2019-03-26 PROCEDURE — 85610 PROTHROMBIN TIME: CPT

## 2019-03-26 PROCEDURE — 86900 BLOOD TYPING SEROLOGIC ABO: CPT

## 2019-03-26 PROCEDURE — 80053 COMPREHEN METABOLIC PANEL: CPT

## 2019-03-26 PROCEDURE — 80048 BASIC METABOLIC PNL TOTAL CA: CPT

## 2019-03-26 PROCEDURE — 86850 RBC ANTIBODY SCREEN: CPT

## 2019-03-26 PROCEDURE — 96374 THER/PROPH/DIAG INJ IV PUSH: CPT

## 2019-03-26 RX ORDER — DOCUSATE SODIUM 100 MG
1 CAPSULE ORAL
Qty: 0 | Refills: 0 | COMMUNITY
Start: 2019-03-26

## 2019-03-26 RX ORDER — METOPROLOL TARTRATE 50 MG
1 TABLET ORAL
Qty: 0 | Refills: 0 | COMMUNITY

## 2019-03-26 RX ORDER — WARFARIN SODIUM 2.5 MG/1
0.5 TABLET ORAL
Qty: 0 | Refills: 0 | COMMUNITY

## 2019-03-26 RX ORDER — SENNA PLUS 8.6 MG/1
2 TABLET ORAL
Qty: 0 | Refills: 0 | COMMUNITY
Start: 2019-03-26

## 2019-03-26 RX ORDER — OXYCODONE HYDROCHLORIDE 5 MG/1
1 TABLET ORAL
Qty: 40 | Refills: 0 | OUTPATIENT
Start: 2019-03-26

## 2019-03-26 RX ORDER — WARFARIN SODIUM 2.5 MG/1
1.5 TABLET ORAL
Qty: 0 | Refills: 0 | COMMUNITY

## 2019-03-26 RX ORDER — POLYETHYLENE GLYCOL 3350 17 G/17G
17 POWDER, FOR SOLUTION ORAL
Qty: 0 | Refills: 0 | COMMUNITY
Start: 2019-03-26

## 2019-03-26 RX ORDER — ACETAMINOPHEN 500 MG
2 TABLET ORAL
Qty: 0 | Refills: 0 | COMMUNITY
Start: 2019-03-26

## 2019-03-26 RX ADMIN — Medication 650 MILLIGRAM(S): at 16:21

## 2019-03-26 RX ADMIN — Medication 650 MILLIGRAM(S): at 16:51

## 2019-03-26 RX ADMIN — OXYCODONE HYDROCHLORIDE 5 MILLIGRAM(S): 5 TABLET ORAL at 03:34

## 2019-03-26 RX ADMIN — PANTOPRAZOLE SODIUM 40 MILLIGRAM(S): 20 TABLET, DELAYED RELEASE ORAL at 06:22

## 2019-03-26 RX ADMIN — OXYCODONE HYDROCHLORIDE 5 MILLIGRAM(S): 5 TABLET ORAL at 04:30

## 2019-03-26 RX ADMIN — HEPARIN SODIUM 14 UNIT(S)/HR: 5000 INJECTION INTRAVENOUS; SUBCUTANEOUS at 00:13

## 2019-03-26 RX ADMIN — LEVETIRACETAM 500 MILLIGRAM(S): 250 TABLET, FILM COATED ORAL at 06:22

## 2019-03-26 NOTE — PROGRESS NOTE ADULT - PROBLEM SELECTOR PROBLEM 3
History of mechanical aortic valve replacement

## 2019-03-26 NOTE — PROGRESS NOTE ADULT - REASON FOR ADMISSION
SDH
SDH, no surgical intervention
subdural hematoma
SDH
headache

## 2019-03-26 NOTE — PROGRESS NOTE ADULT - PROBLEM SELECTOR PLAN 4
- rate controlled  - off beta blockers, c/w heparin bridge to coumadin
cont Metoprolol. AC.
- rate controlled  - off beta blockers

## 2019-03-26 NOTE — PROGRESS NOTE ADULT - ASSESSMENT
HPI:  Patient is a 48 year old male with hx of MI and mechanical aortic valve on coumadin with persistent headaches after a fall one month prior.  MRI was done as outpatient showing subdural hematoma.  Presented to ED afterwards and admitted to the neurosurgery service for further management.     PLAN:  -INR therapeutic at 2.01 this am, heparin drip stopped and CTH completed and revealed stable bilateral SDHs. will stop keppra on d/c as he has completed >7days seizure prophylaxis.     no surgical intervention for SDH  -ambien 5mg x 1 tonight for sleep and protected sleep time given agitation/ sleep disturbance  -oxycodone for pain control  -senna and colace for bowel regimen  -continue protonix for GI prophylaxis  -sodium and cholesterol restricted diet  -out of bed with assistance  -incentive spirometer for lung expansion  -pt - no needs upon discharge  -appreciate cardiology and hospitalist medicine consults  patient instructed to take coumadin 10mg nightly, have INR checked thurs with cardiologist and dose will be adjusted accordingly for goal INR 2-3; he was informed of his BB stopping and to f/u with his cardiologist regarding need to restart. Discussed signs/symptoms to be concerned for and to call MD/return to ER for. Discussed with patient all follow up plans, activity, and medications. Questions answered, and he verbalized understanding. [facility].   will IVL and d/c home today.     will discuss with Dr Calles  Spectra #[phone]    Assessment:  Please Check When Present   []  GCS  E   V  M     Heart Failure: []Acute, [] acute on chronic , []chronic  Heart Failure:  [] Diastolic (HFpEF), [] Systolic (HFrEF), []Combined (HFpEF and HFrEF), [] RHF, [] Pulm HTN, [] Other    [] SAY, [] ATN, [] AIN, [] other  [] CKD1, [] CKD2, [] CKD 3, [] CKD 4, [] CKD 5, []ESRD    Encephalopathy: [] Metabolic, [] Hepatic, [] toxic, [] Neurological, [] Other    Abnormal Nutritional Status: [] malnutrition (see nutrition note), [ ]underweight: BMI < 19, [] morbid obesity: BMI >40, [] Cachexia    [] Sepsis  [] hypovolemic shock,[] cardiogenic shock, [] hemorrhagic shock, [] neurogenic shock  [] Acute Respiratory Failure  []Cerebral edema, [] Brain compression/ herniation,   [] Functional quadriplegia  [] Acute blood loss anemia

## 2019-03-26 NOTE — PROGRESS NOTE ADULT - PROBLEM SELECTOR PLAN 2
- sinus, asymptomatic
improved on lower Metoprolol. monitor
- sinus, asymptomatic

## 2019-03-26 NOTE — PROGRESS NOTE ADULT - PROVIDER SPECIALTY LIST ADULT
Cardiology
Hospitalist
Internal Medicine
Internal Medicine
Neurosurgery
Cardiology
Cardiology
Hospitalist
Hospitalist

## 2019-03-26 NOTE — PROGRESS NOTE ADULT - PROBLEM SELECTOR PLAN 1
- clinically stable  - stable head CT  - monitor on Hep gtt
clinically stable. stable head CT. awaiting repeat head CT once PTT therapeutic
starting AC per neurosurg. monitor PTT/INR
clinically stable. stable head CT. monitor on Hep gtt. Repeat CT head while therapeutic PTT per neurosurg
- clinically stable  - stable head CT

## 2019-03-26 NOTE — PROGRESS NOTE ADULT - ASSESSMENT
48 year old male with PMH aflutter s/p ablation (2017), aortic aneurysm s/p Bentall procedure with mechanical aortic valve (2013) on coumadin, gastric bypass pw headaches c outpatient MRI showing small right acute on chronic SDH. Hematoma stable on repeat studies, no intervention needed. AC restarted.

## 2019-03-26 NOTE — PROGRESS NOTE ADULT - SUBJECTIVE AND OBJECTIVE BOX
Patient is a 48 year old male with hx of MI and mechanical aortic valve on coumadin with persistent headaches after a fall one month prior.  MRI was done as outpatient showing subdural hematoma.  Presented to ED afterwards and admitted to the neurosurgery service for further management.      OVERNIGHT EVENTS:  No acute events overnight.  Denies any headaches. c/o not being able to sleep    Vital Signs Last 24 Hrs  T(C): 36.7 (26 Mar 2019 15:49), Max: 37.1 (26 Mar 2019 07:08)  T(F): 98.1 (26 Mar 2019 15:49), Max: 98.8 (26 Mar 2019 11:53)  HR: 65 (26 Mar 2019 15:49) (63 - 84)  BP: 137/87 (26 Mar 2019 15:49) (120/80 - 137/87)  BP(mean): --  RR: 18 (26 Mar 2019 15:49) (18 - 19)  SpO2: 96% (26 Mar 2019 15:49) (96% - 98%)    PHYSICAL EXAM:  Neurological: awake, alert, oriented x3, follows commands, speech clear and fluent, perrla bilat, eomi, face symmetric, tongue midline, no drift b/l, moves all extremities x4 w/ 5/5 strength throughout, sensation present, intact, equal throughout; ambulating independently  Cardiovascular: +s1, s2  Respiratory: clear to auscultation b/l  Gastrointestinal: soft, non-distended, non-tender  Genitourinary: +voiding    LABS:                                  16.8   10.01 )-----------( 187      ( 26 Mar 2019 09:16 )             50.0   PT/INR - ( 26 Mar 2019 08:17 )   PT: 23.2 sec;   INR: 2.01 ratio       PTT - ( 26 Mar 2019 08:17 )  PTT:52.3 sec    MEDICATIONS  (STANDING):  docusate sodium 100 milliGRAM(s) Oral three times a day  heparin  Infusion 1700 Unit(s)/Hr (17 mL/Hr) IV Continuous <Continuous>  levETIRAcetam 500 milliGRAM(s) Oral every 12 hours  melatonin 5 milliGRAM(s) Oral at bedtime  pantoprazole    Tablet 40 milliGRAM(s) Oral before breakfast  polyethylene glycol 3350 17 Gram(s) Oral daily  senna 2 Tablet(s) Oral at bedtime  warfarin 15 milliGRAM(s) Oral <User Schedule>    MEDICATIONS  (PRN):  acetaminophen   Tablet .. 650 milliGRAM(s) Oral every 6 hours PRN Temp greater or equal to 38C (100.4F), Mild Pain (1 - 3)  aluminum hydroxide/magnesium hydroxide/simethicone Suspension 30 milliLiter(s) Oral every 4 hours PRN Dyspepsia  bisacodyl Suppository 10 milliGRAM(s) Rectal daily PRN Constipation  cyclobenzaprine 10 milliGRAM(s) Oral three times a day PRN Muscle Spasm  diphenhydrAMINE 25 milliGRAM(s) Oral every 4 hours PRN Insomnia  ondansetron   Disintegrating Tablet 4 milliGRAM(s) Oral every 6 hours PRN Nausea  oxyCODONE    IR 5 milliGRAM(s) Oral every 4 hours PRN Moderate Pain (4 - 6)  oxyCODONE    IR 10 milliGRAM(s) Oral every 4 hours PRN Severe Pain (7 - 10)  zolpidem 5 milliGRAM(s) Oral at bedtime PRN Insomnia    IMAGING:    < from: CT Head No Cont (03.26.19 @ 11:40) >  INTERPRETATION:    CLINICAL INDICATION: Follow-up subdural hematoma, on Coumadin, history of   prior fall        5mm axial sections of the brain were obtained from base to vertex,   without the intravenous administration of contrast material. Coronal and   sagittal computer generated reconstructed views are available.    Comparison is made with the prior CT of 3/20/2019.    There is a small right frontal parietal mixed density collection which is   unchanged in size or appearance when compared to the prior examination   3/20/2019. It is decreased in size and density however compared with   3/14/2019. A small amount of high density hemorrhage is identified in the   anterior interhemispheric fissure which is unchanged. No new hemorrhage   is identified.    The ventricles are normal in size and position. There is no midline shift.        IMPRESSION: Small predominantly low density right frontal parietal   subdural hematoma unchanged since 3/20/2019 with a small amount of acute   hemorrhage in the anterior interhemispheric fissure unchanged since   3/20/2019. No evidence of interval hemorrhage.      < end of copied text >

## 2019-03-26 NOTE — DISCHARGE NOTE PROVIDER - REASON FOR ADMISSION
stable bilateral SDHs (subdural hematomas), supratherapeutic INR on admission and reversals given; no surgical intervention on this admission; anticoagulation restarted and CTH/SDHs stable

## 2019-03-26 NOTE — DISCHARGE NOTE PROVIDER - CARE PROVIDER_API CALL
Norberto Calles)  Neurosurgery  [address], [address] [place] [address]  Phone: [phone]  Fax: [phone]  Follow Up Time:

## 2019-03-26 NOTE — DISCHARGE NOTE PROVIDER - HOSPITAL COURSE
Lenny Person, 48M significant cardiac hx s/p mechanical valve on Coumadin got outpatient MRI for continued headaches after fall 1 month ago showing small R acute on chronic SDH stable on 4h CTH done here in ER. Intact on exam except mild headache and debatable LUE VERY subtle drift. (13 Mar 2019 19:40)        Patient admitted and found to have supratherapeutic INR 4.22 and bilateral SDHs; reversals given and neurological exam and bilateral SDHs have been stable on serial CT heads off anticoagulation.     Anticoagulation for his mechanical aortic valve with heparin drip started on 3/20/19 and bridge to coumadin started on 3/22/19. On 3/26/19 his INR is therapeutic at 2.01 and CTH     revealed stable bilateral SDHs. He remained neurologically intact. He was ambulating independently. He was followed in consultation by cardiology and hospitalist medicine throughout     his stay. Lenny Person, 48M significant cardiac hx s/p mechanical valve on Coumadin got outpatient MRI for continued headaches after fall 1 month ago showing small R acute on chronic SDH stable on 4h CTH done here in ER. Intact on exam except mild headache and debatable LUE VERY subtle drift. (13 Mar 2019 19:40)        Patient admitted and found to have supratherapeutic INR 4.22 and bilateral SDHs; reversals given and neurological exam and bilateral SDHs have been stable on serial CT heads off anticoagulation.     Anticoagulation for his mechanical aortic valve with heparin drip started on 3/20/19 and bridge to coumadin started on 3/22/19. On 3/26/19 his INR is therapeutic at 2.01 and CTH     revealed stable bilateral SDHs. He remained neurologically intact. He was ambulating independently. He was followed in consultation by cardiology and hospitalist medicine throughout     his stay. He will follow up in 2 days for INR check with his cardiologist fir further coumadin dosing. On 3/26/19 he was medically and neurologically stable for discharge to home. Lenny Person, 48M significant cardiac hx s/p mechanical valve on Coumadin got outpatient MRI for continued headaches after fall 1 month ago showing small R acute on chronic SDH stable on 4h CTH done here in ER. Intact on exam except mild headache and debatable LUE VERY subtle drift. (13 Mar 2019 19:40)        Patient admitted and found to have supratherapeutic INR 4.22 and bilateral SDHs; reversals given and neurological exam and bilateral SDHs have been stable on serial CT heads off anticoagulation.     Anticoagulation for his mechanical aortic valve with heparin drip started on 3/20/19 and bridge to coumadin started on 3/22/19. On 3/26/19 his INR is therapeutic at 2.01 and CTH     revealed stable bilateral SDHs. He remained neurologically intact. He was ambulating independently. He was followed in consultation by cardiology and hospitalist medicine throughout     his stay. He will follow up in 2 days for INR check with his cardiologist fir further coumadin dosing. On 3/26/19 he was medically and neurologically stable for discharge to home.         More than 30 minutes were spent educating the patient and family regarding condition, medications, follow up plans, signs and symptoms to be concerned with, preparing paperwork, and questions answered regarding discharge.

## 2019-03-26 NOTE — PROGRESS NOTE ADULT - SUBJECTIVE AND OBJECTIVE BOX
Subjective: Patient seen and examined. No new events except as noted.     SUBJECTIVE/ROS:  No chest pain, dyspnea, palpitation, or dizziness.     MEDICATIONS:  MEDICATIONS  (STANDING):  docusate sodium 100 milliGRAM(s) Oral three times a day  heparin  Infusion 1700 Unit(s)/Hr (14 mL/Hr) IV Continuous <Continuous>  levETIRAcetam 500 milliGRAM(s) Oral every 12 hours  melatonin 5 milliGRAM(s) Oral at bedtime  pantoprazole    Tablet 40 milliGRAM(s) Oral before breakfast  polyethylene glycol 3350 17 Gram(s) Oral daily  senna 2 Tablet(s) Oral at bedtime      PHYSICAL EXAM:  T(C): 36.2 (03-26-19 @ 06:04), Max: 37.1 (03-25-19 @ 11:44)  HR: 64 (03-26-19 @ 06:04) (56 - 64)  BP: 120/80 (03-26-19 @ 06:04) (119/76 - 137/74)  RR: 19 (03-26-19 @ 06:04) (18 - 19)  SpO2: 97% (03-26-19 @ 06:04) (96% - 99%)  Wt(kg): --  I&O's Summary    25 Mar 2019 07:01  -  26 Mar 2019 07:00  --------------------------------------------------------  IN: 2448 mL / OUT: 1 mL / NET: 2447 mL            JVP: Normal  Neck: supple  Lung: clear   CV: S1 S2 , Murmur:  Abd: soft  Ext: No edema  neuro: Awake / alert  Psych: flat affect  Skin: normal``    LABS/DATA:    CARDIAC MARKERS:                                17.6   11.4  )-----------( 185      ( 25 Mar 2019 05:10 )             50.9           proBNP:   Lipid Profile:   HgA1c:   TSH:     TELE:  EKG:

## 2019-03-26 NOTE — PROGRESS NOTE ADULT - ASSESSMENT
aflutter   s/p ablation     mechanical avr  hemodynamically stable   on heparin infusion with coumadin  INR goal 2 - 3

## 2019-03-26 NOTE — PROGRESS NOTE ADULT - PROBLEM SELECTOR PLAN 3
- c/w Hep drip with coumadin dosing  - daily INR checks
on Hep gtt. Start Coumadin once PTT therapeutic. monitor PTT/INR
will need AC. monitor as above
on Hep gtt. Start Coumadin home dose of 15 mg x1. Monitor INR
INR therapeutic. I spoke with pt's PMD Ramon Infante and Cardiologist Delvis Kurtz to discuss hospitalization and supratherapeutic INR on admission. They both agreed for pt to go home on 10 mg/day of Coumadin and have his INR rechecked on Thursday 3/28 and have it monitored by them.

## 2019-03-26 NOTE — DISCHARGE NOTE PROVIDER - NSDCFUADDINST_GEN_ALL_CORE_FT
Do not drive or return to school/work until cleared by surgeon   please notify physician or return to emergency room if fevers, bleeding, swelling, pain not relieved by medication, increased sluggishness or irritability, increased nausea or vomiting, inability to tolerate foods or liquids.   please do not engage in strenuous activity, heavy lifting, drive, or return to work or school until cleared by surgeon.   please keep incision clean and dry, do not submerge wound in water for prolonged periods of time, pat dry after showering, and do not use any creams or ointments to incision.

## 2019-03-26 NOTE — DISCHARGE NOTE PROVIDER - NSDCCPCAREPLAN_GEN_ALL_CORE_FT
PRINCIPAL DISCHARGE DIAGNOSIS  Diagnosis: Subdural hematoma  Assessment and Plan of Treatment: stable on serial CTHs, off and on restarted anticoagulation, no surgical intervention.   Dr Calles- neurosurgeon-please call office for appointment in 1-2 weeks.   Cardiologist and primary care physician within 1 week.      SECONDARY DISCHARGE DIAGNOSES  Diagnosis: Atrial flutter  Assessment and Plan of Treatment: Atrial flutter  Please continue medications and follow up with your cardiologist and primary care physician within 1 weeks.    Diagnosis: Bradycardia  Assessment and Plan of Treatment: Your beta blocker was stopped here in hospital as your heart rate was low. Please follow up with your cardiologist within 1 week to evaluate need to restart medications.    Diagnosis: History of mechanical aortic valve replacement  Assessment and Plan of Treatment: Take coumadin 10mg nightly and dosing to be adjusted based on INR. INR 2.01 this morning. Please have INR checked on thursday with your cardiologist.

## 2019-03-26 NOTE — PROGRESS NOTE ADULT - SUBJECTIVE AND OBJECTIVE BOX
Patient is a 48y old  Male who presents with a chief complaint of subdural hematoma (25 Mar 2019 12:26)    HPI: Pt feels well. Eager to go home.     Vital Signs Last 24 Hrs  T(C): 37.1 (26 Mar 2019 11:53), Max: 37.1 (26 Mar 2019 07:08)  T(F): 98.8 (26 Mar 2019 11:53), Max: 98.8 (26 Mar 2019 11:53)  HR: 66 (26 Mar 2019 11:53) (56 - 84)  BP: 133/73 (26 Mar 2019 11:53) (119/76 - 133/73)  BP(mean): --  RR: 18 (26 Mar 2019 11:53) (18 - 19)  SpO2: 98% (26 Mar 2019 11:53) (96% - 98%)                          16.8   10.01 )-----------( 187      ( 26 Mar 2019 09:16 )             50.0       MEDICATIONS  (STANDING):  docusate sodium 100 milliGRAM(s) Oral three times a day  levETIRAcetam 500 milliGRAM(s) Oral every 12 hours  melatonin 5 milliGRAM(s) Oral at bedtime  pantoprazole    Tablet 40 milliGRAM(s) Oral before breakfast  polyethylene glycol 3350 17 Gram(s) Oral daily  senna 2 Tablet(s) Oral at bedtime    MEDICATIONS  (PRN):  acetaminophen   Tablet .. 650 milliGRAM(s) Oral every 6 hours PRN Temp greater or equal to 38C (100.4F), Mild Pain (1 - 3)  aluminum hydroxide/magnesium hydroxide/simethicone Suspension 30 milliLiter(s) Oral every 4 hours PRN Dyspepsia  bisacodyl Suppository 10 milliGRAM(s) Rectal daily PRN Constipation  cyclobenzaprine 10 milliGRAM(s) Oral three times a day PRN Muscle Spasm  diphenhydrAMINE 25 milliGRAM(s) Oral every 4 hours PRN Insomnia  ondansetron   Disintegrating Tablet 4 milliGRAM(s) Oral every 6 hours PRN Nausea  oxyCODONE    IR 5 milliGRAM(s) Oral every 4 hours PRN Moderate Pain (4 - 6)    INR- 2.1

## 2019-03-26 NOTE — DISCHARGE NOTE PROVIDER - NSDCACTIVITY_GEN_ALL_CORE
Showering allowed/Do not make important decisions/Stairs allowed/Walking - Indoors allowed/No heavy lifting/straining/Bathing allowed/Walking - Outdoors allowed/Do not drive or operate machinery

## 2019-04-22 ENCOUNTER — APPOINTMENT (OUTPATIENT)
Dept: NEUROSURGERY | Facility: CLINIC | Age: 49
End: 2019-04-22
Payer: COMMERCIAL

## 2019-04-22 VITALS
TEMPERATURE: 98.5 F | RESPIRATION RATE: 18 BRPM | SYSTOLIC BLOOD PRESSURE: 140 MMHG | BODY MASS INDEX: 31.29 KG/M2 | OXYGEN SATURATION: 96 % | DIASTOLIC BLOOD PRESSURE: 92 MMHG | HEIGHT: 77 IN | WEIGHT: 265 LBS | HEART RATE: 67 BPM

## 2019-04-22 DIAGNOSIS — S06.5X9A TRAUMATIC SUBDURAL HEMORRHAGE WITH LOSS OF CONSCIOUSNESS OF UNSPECIFIED DURATION, INITIAL ENCOUNTER: ICD-10-CM

## 2019-04-22 PROCEDURE — 99215 OFFICE O/P EST HI 40 MIN: CPT

## 2019-04-23 PROBLEM — S06.5X9A SUBDURAL HEMATOMA: Status: ACTIVE | Noted: 2019-04-23

## 2019-04-23 RX ORDER — ACETAMINOPHEN 325 MG/1
TABLET, FILM COATED ORAL
Refills: 0 | Status: ACTIVE | COMMUNITY

## 2019-04-25 NOTE — PHYSICAL EXAM
[General Appearance - Alert] : alert [General Appearance - In No Acute Distress] : in no acute distress [General Appearance - Well Nourished] : well nourished [General Appearance - Well Developed] : well developed [General Appearance - Well-Appearing] : healthy appearing [Oriented To Time, Place, And Person] : oriented to person, place, and time [Impaired Insight] : insight and judgment were intact [Affect] : the affect was normal [Mood] : the mood was normal [Memory Recent] : recent memory was not impaired [Person] : oriented to person [Place] : oriented to place [Time] : oriented to time [Cranial Nerves Optic (II)] : visual acuity intact bilaterally,  pupils equal round and reactive to light [Cranial Nerves Oculomotor (III)] : extraocular motion intact [Cranial Nerves Trigeminal (V)] : facial sensation intact symmetrically [Cranial Nerves Facial (VII)] : face symmetrical [Cranial Nerves Vestibulocochlear (VIII)] : hearing was intact bilaterally [Cranial Nerves Glossopharyngeal (IX)] : tongue and palate midline [Cranial Nerves Accessory (XI - Cranial And Spinal)] : head turning and shoulder shrug symmetric [Cranial Nerves Hypoglossal (XII)] : there was no tongue deviation with protrusion [Motor Strength] : muscle strength was normal in all four extremities [Balance] : balance was intact [Sclera] : the sclera and conjunctiva were normal [Extraocular Movements] : extraocular movements were intact [PERRL With Normal Accommodation] : pupils were equal in size, round, reactive to light, with normal accommodation [Outer Ear] : the ears and nose were normal in appearance [Hearing Threshold Finger Rub Not Bee] : hearing was normal [Oropharynx] : the oropharynx was normal [Neck Appearance] : the appearance of the neck was normal [Respiration, Rhythm And Depth] : normal respiratory rhythm and effort [Exaggerated Use Of Accessory Muscles For Inspiration] : no accessory muscle use [Heart Rate And Rhythm] : heart rate was normal and rhythm regular [Abnormal Walk] : normal gait [Involuntary Movements] : no involuntary movements were seen [Motor Tone] : muscle strength and tone were normal [Skin Color & Pigmentation] : normal skin color and pigmentation [] : no rash [FreeTextEntry5] : no drift both upper extremities

## 2019-04-25 NOTE — REASON FOR VISIT
[Follow-Up: _____] : a [unfilled] follow-up visit [Other: _____] : [unfilled] [FreeTextEntry1] : admit 3/26/19 - discharge date 3/13/19\par Stable bilateral SDHs (subdural hematomas), supra therapeutic INR on admission and reversals given; no surgical intervention on this admission; anticoagulation restarted and CTH/SDHs stable \par Hospital Course	 \par Lenny Person, 48M significant cardiac hx s/p mechanical valve on Coumadin got outpatient MRI for continued headaches after fall 1 month ago showing small R acute on chronic SDH stable on 4h CTH done here in ER. Intact on exam except mild headache and debatable LUE VERY subtle drift. (13 Mar 2019 19:40) Patient admitted and found to have supra therapeutic INR 4.22 and bilateral SDHs; reversals given and neurological exam and bilateral SDHs have been stable on serial CT heads off anticoagulation. Anticoagulation for his mechanical aortic valve with heparin drip started on 3/20/19 and bridge to Coumadin started on 3/22/19. On 3/26/19 his INR is therapeutic at 2.01 and CTH revealed stable bilateral SDHs. He remained neurologically intact. He was ambulating independently. He was followed in consultation by cardiology and hospitalist medicine throughout his stay. He will follow up in 2 days for INR check with his cardiologist fir further Coumadin dosing. On 3/26/19 he was medically and neurologically stable for discharge to home. \par \par Today he presents for follow up visit after hospita stay with c/o intermittent headaches 3/10 with associated "pressure like sensation"  that are brought on by coughing and when leaning forward.  Pain is relieved with Tylenol prn.  His PCP is Dr. Ramon Infante  and his Cardiologist is Dr. Delvis Saini, [facility], [place] .  He states that he is currently taking Coumadin 10 mg daily and Coumadin 15 mg on weekends. He also states that he gets his INR checked on a weekly basis but cannot recall his number at this time.  \par \par He states that he is a former smoker 1pk/day for about 10 years but quit smoking 8 years ago.\par

## 2019-04-25 NOTE — ASSESSMENT
[FreeTextEntry1] : IMPRESSION:\par 1. Stable bilateral SDH that are resolving.\par 2. Pt is neurologically stable with mild intermittent headaches.\par \par \par PLAN:\par 1. Per Dr. Calles no further neuro imaging is needed.\par 2. F/U with us prn.\par 3. Advised patient to continue following up with his PCP and Cardiologist.

## 2020-01-02 ENCOUNTER — EMERGENCY (EMERGENCY)
Facility: HOSPITAL | Age: 50
LOS: 1 days | Discharge: HOME | End: 2020-01-02
Attending: EMERGENCY MEDICINE
Payer: COMMERCIAL

## 2020-01-02 VITALS
HEART RATE: 54 BPM | TEMPERATURE: 98 F | OXYGEN SATURATION: 100 % | WEIGHT: 309.97 LBS | RESPIRATION RATE: 18 BRPM | HEIGHT: 77 IN | DIASTOLIC BLOOD PRESSURE: 83 MMHG | SYSTOLIC BLOOD PRESSURE: 137 MMHG

## 2020-01-02 VITALS
DIASTOLIC BLOOD PRESSURE: 81 MMHG | TEMPERATURE: 98 F | OXYGEN SATURATION: 99 % | SYSTOLIC BLOOD PRESSURE: 145 MMHG | RESPIRATION RATE: 16 BRPM | HEART RATE: 52 BPM

## 2020-01-02 DIAGNOSIS — Z95.2 PRESENCE OF PROSTHETIC HEART VALVE: Chronic | ICD-10-CM

## 2020-01-02 LAB
ALBUMIN SERPL ELPH-MCNC: 4.1 G/DL — SIGNIFICANT CHANGE UP (ref 3.3–5)
ALP SERPL-CCNC: 34 U/L — LOW (ref 40–120)
ALT FLD-CCNC: 17 U/L — SIGNIFICANT CHANGE UP (ref 10–45)
ANION GAP SERPL CALC-SCNC: 13 MMOL/L — SIGNIFICANT CHANGE UP (ref 5–17)
APTT BLD: 36.4 SEC — HIGH (ref 27.5–36.3)
AST SERPL-CCNC: 16 U/L — SIGNIFICANT CHANGE UP (ref 10–40)
BASOPHILS # BLD AUTO: 0.09 K/UL — SIGNIFICANT CHANGE UP (ref 0–0.2)
BASOPHILS NFR BLD AUTO: 1.1 % — SIGNIFICANT CHANGE UP (ref 0–2)
BILIRUB SERPL-MCNC: 0.4 MG/DL — SIGNIFICANT CHANGE UP (ref 0.2–1.2)
BUN SERPL-MCNC: 14 MG/DL — SIGNIFICANT CHANGE UP (ref 7–23)
CALCIUM SERPL-MCNC: 10 MG/DL — SIGNIFICANT CHANGE UP (ref 8.4–10.5)
CHLORIDE SERPL-SCNC: 103 MMOL/L — SIGNIFICANT CHANGE UP (ref 96–108)
CO2 SERPL-SCNC: 24 MMOL/L — SIGNIFICANT CHANGE UP (ref 22–31)
CREAT SERPL-MCNC: 1.06 MG/DL — SIGNIFICANT CHANGE UP (ref 0.5–1.3)
EOSINOPHIL # BLD AUTO: 0.35 K/UL — SIGNIFICANT CHANGE UP (ref 0–0.5)
EOSINOPHIL NFR BLD AUTO: 4.2 % — SIGNIFICANT CHANGE UP (ref 0–6)
GLUCOSE SERPL-MCNC: 98 MG/DL — SIGNIFICANT CHANGE UP (ref 70–99)
HCT VFR BLD CALC: 48.2 % — SIGNIFICANT CHANGE UP (ref 39–50)
HGB BLD-MCNC: 15.9 G/DL — SIGNIFICANT CHANGE UP (ref 13–17)
IMM GRANULOCYTES NFR BLD AUTO: 0.2 % — SIGNIFICANT CHANGE UP (ref 0–1.5)
INR BLD: 2.11 RATIO — HIGH (ref 0.88–1.16)
LYMPHOCYTES # BLD AUTO: 2.14 K/UL — SIGNIFICANT CHANGE UP (ref 1–3.3)
LYMPHOCYTES # BLD AUTO: 25.5 % — SIGNIFICANT CHANGE UP (ref 13–44)
MCHC RBC-ENTMCNC: 31.1 PG — SIGNIFICANT CHANGE UP (ref 27–34)
MCHC RBC-ENTMCNC: 33 GM/DL — SIGNIFICANT CHANGE UP (ref 32–36)
MCV RBC AUTO: 94.1 FL — SIGNIFICANT CHANGE UP (ref 80–100)
MONOCYTES # BLD AUTO: 0.7 K/UL — SIGNIFICANT CHANGE UP (ref 0–0.9)
MONOCYTES NFR BLD AUTO: 8.3 % — SIGNIFICANT CHANGE UP (ref 2–14)
NEUTROPHILS # BLD AUTO: 5.1 K/UL — SIGNIFICANT CHANGE UP (ref 1.8–7.4)
NEUTROPHILS NFR BLD AUTO: 60.7 % — SIGNIFICANT CHANGE UP (ref 43–77)
NRBC # BLD: 0 /100 WBCS — SIGNIFICANT CHANGE UP (ref 0–0)
PLATELET # BLD AUTO: 201 K/UL — SIGNIFICANT CHANGE UP (ref 150–400)
POTASSIUM SERPL-MCNC: 4.7 MMOL/L — SIGNIFICANT CHANGE UP (ref 3.5–5.3)
POTASSIUM SERPL-SCNC: 4.7 MMOL/L — SIGNIFICANT CHANGE UP (ref 3.5–5.3)
PROT SERPL-MCNC: 6.8 G/DL — SIGNIFICANT CHANGE UP (ref 6–8.3)
PROTHROM AB SERPL-ACNC: 24.8 SEC — HIGH (ref 10–12.9)
RBC # BLD: 5.12 M/UL — SIGNIFICANT CHANGE UP (ref 4.2–5.8)
RBC # FLD: 13.2 % — SIGNIFICANT CHANGE UP (ref 10.3–14.5)
SODIUM SERPL-SCNC: 140 MMOL/L — SIGNIFICANT CHANGE UP (ref 135–145)
WBC # BLD: 8.4 K/UL — SIGNIFICANT CHANGE UP (ref 3.8–10.5)
WBC # FLD AUTO: 8.4 K/UL — SIGNIFICANT CHANGE UP (ref 3.8–10.5)

## 2020-01-02 PROCEDURE — 99284 EMERGENCY DEPT VISIT MOD MDM: CPT

## 2020-01-02 PROCEDURE — 70450 CT HEAD/BRAIN W/O DYE: CPT

## 2020-01-02 PROCEDURE — 80053 COMPREHEN METABOLIC PANEL: CPT

## 2020-01-02 PROCEDURE — 85027 COMPLETE CBC AUTOMATED: CPT

## 2020-01-02 PROCEDURE — 85610 PROTHROMBIN TIME: CPT

## 2020-01-02 PROCEDURE — 99284 EMERGENCY DEPT VISIT MOD MDM: CPT | Mod: 25

## 2020-01-02 PROCEDURE — 96374 THER/PROPH/DIAG INJ IV PUSH: CPT

## 2020-01-02 PROCEDURE — 85730 THROMBOPLASTIN TIME PARTIAL: CPT

## 2020-01-02 PROCEDURE — 70450 CT HEAD/BRAIN W/O DYE: CPT | Mod: 26

## 2020-01-02 RX ORDER — METOCLOPRAMIDE HCL 10 MG
10 TABLET ORAL ONCE
Refills: 0 | Status: COMPLETED | OUTPATIENT
Start: 2020-01-02 | End: 2020-01-02

## 2020-01-02 RX ORDER — SODIUM CHLORIDE 9 MG/ML
1000 INJECTION INTRAMUSCULAR; INTRAVENOUS; SUBCUTANEOUS ONCE
Refills: 0 | Status: COMPLETED | OUTPATIENT
Start: 2020-01-02 | End: 2020-01-02

## 2020-01-02 RX ADMIN — SODIUM CHLORIDE 1000 MILLILITER(S): 9 INJECTION INTRAMUSCULAR; INTRAVENOUS; SUBCUTANEOUS at 16:16

## 2020-01-02 RX ADMIN — Medication 10 MILLIGRAM(S): at 16:22

## 2020-01-02 NOTE — ED PROVIDER NOTE - PATIENT PORTAL LINK FT
You can access the FollowMyHealth Patient Portal offered by [facility] by registering at the following website: [email or web address]. By joining [employer]’s FollowMyHealth portal, you will also be able to view your health information using other applications (apps) compatible with our system.

## 2020-01-02 NOTE — ED PROVIDER NOTE - CLINICAL SUMMARY MEDICAL DECISION MAKING FREE TEXT BOX
48 y/o M w/ hx subdural bleed, a-flutter, aortic aneurysm s/p bentall on coumadin p/w worsening HA x1 week without focal neuro deficits. HA similar in quality to prior. Will obtain CTH, reglan/fluids for sx control, labs/coags, reassess.

## 2020-01-02 NOTE — ED PROVIDER NOTE - ATTENDING CONTRIBUTION TO CARE
49M pmh SDH, afib s/p ablation, aortic valve on coumadin p/w posterior HA waxing and waning, with no modifying factors, no vision changes, vomiting, confusion, falls or head trauma, fevers. Neuro: Awake, alert and fully oriented x 4. No evidence of cognitive or language dysfunction.  Cranial nerves: Extraocular movements full. Pupils equal, round, react to light. No nystagmus noted. Fifth nerve function is normal. There is no facial asymmetry noted. CN XII intact.   Motor exam: good tone and bulk throughout. No pronator drift. Muscle strength is 5/5 throughout. Sensory examination is intact. Normal gait. Finger-to-nose is normal. Romberg sign negative.   CT head, labs with INR, meds for HA.

## 2020-01-02 NOTE — ED PROVIDER NOTE - NSFOLLOWUPINSTRUCTIONS_ED_ALL_ED_FT
You were seen in the Emergency Department for headache.  1) Advance activity as tolerated.    2) Continue all previously prescribed medications as directed.    3) Follow up with your primary care physician in 24-48 hours - take copies of your results.    4) Return to the Emergency Department for worsening or persistent symptoms, and/or ANY NEW OR CONCERNING SYMPTOMS as described below.     Headache    A headache is pain or discomfort felt around the head or neck area. The specific cause of a headache may not be found as there are many types including tension headaches, migraine headaches, and cluster headaches. Watch your condition for any changes. Things you can do to manage your pain include taking over the counter and prescription medications as instructed by your health care provider, lying down in a dark quiet room, limiting stress, getting regular sleep, and refraining from alcohol and tobacco products.    SEEK IMMEDIATE MEDICAL CARE IF YOU HAVE ANY OF THE FOLLOWING SYMPTOMS: fever, vomiting, stiff neck, loss of vision, problems with speech, muscle weakness, loss of balance, trouble walking, passing out, or confusion.

## 2020-01-02 NOTE — ED PROVIDER NOTE - OBJECTIVE STATEMENT
50 y/o M w/ hx subdural bleed, aflutter, aortic aneurysm s/p bentall procedure, HTN on coumadin and metoprolol p/w HA x1 week, worsening, similar in quality to prior subdural bleed, pain radiates to neck and occiput. Denies numbness/tingling, nausea/vomiting. Last took tylenol 10am. No slurred speech, difficulty walking, changes in strength/sensation in arms or legs, denies any urinary frequency/urgency, dysuria. Denies visual changes, dizziness/vertigo, flashes/floaters.

## 2020-01-02 NOTE — ED PROVIDER NOTE - PHYSICAL EXAMINATION
[Const] well-appearing, resting comfortably, no acute distress  [HEENT] PERRL, EOM, MMM, CVF intact  [Neck] Supple, trachea midline  [CV] +S1/S2, no m/r/g appreciated  [Lungs] CTABL, no adventitious lung sounds  [Abd] soft, non-tender, nondistended in all 4 quadrants  [MSK] 5/5 UE and LR str BL  [Skin] warm, dry, well-perfused  [Neuro] A&Ox3, CN II-XII intact, neg dysmetria/ataxia/pronator drift, gait intact, nml FTN/TESFAYE

## 2020-01-02 NOTE — ED ADULT NURSE NOTE - OBJECTIVE STATEMENT
49 year old male patient presents ambulatory to ED c/o intermittent HAs x 1.5 weeks radiating down to neck. Patient states pain feels similar to previous episode in March when he was found to have SDH. Pt on coumadin s/p AVR, and denies recent falls or trauma. Patient denies associated blurred vision, dizziness, CP, SOB, palpitations, abd pain, n/v/d, fever, chills, urinary symptoms. Patient had been taking tylenol with little improvement in pain, last dose this morning. Patient aware of plan of care for CT/labs.

## 2020-01-02 NOTE — ED ADULT NURSE NOTE - CHPI ED NUR SYMPTOMS NEG
no confusion/no vomiting/no loss of consciousness/no nausea/no blurred vision/no weakness/no change in level of consciousness/no dizziness/no numbness

## 2020-01-22 ENCOUNTER — TRANSCRIPTION ENCOUNTER (OUTPATIENT)
Age: 50
End: 2020-01-22

## 2020-01-23 ENCOUNTER — RESULT REVIEW (OUTPATIENT)
Age: 50
End: 2020-01-23

## 2020-01-23 ENCOUNTER — OUTPATIENT (OUTPATIENT)
Dept: OUTPATIENT SERVICES | Facility: HOSPITAL | Age: 50
LOS: 1 days | End: 2020-01-23
Payer: COMMERCIAL

## 2020-01-23 DIAGNOSIS — Z12.11 ENCOUNTER FOR SCREENING FOR MALIGNANT NEOPLASM OF COLON: ICD-10-CM

## 2020-01-23 DIAGNOSIS — Z95.2 PRESENCE OF PROSTHETIC HEART VALVE: Chronic | ICD-10-CM

## 2020-01-23 PROCEDURE — 45385 COLONOSCOPY W/LESION REMOVAL: CPT | Mod: PT

## 2020-01-23 PROCEDURE — 45380 COLONOSCOPY AND BIOPSY: CPT | Mod: PT,XS

## 2020-01-23 PROCEDURE — C1889: CPT

## 2020-01-23 PROCEDURE — 88305 TISSUE EXAM BY PATHOLOGIST: CPT

## 2020-01-23 PROCEDURE — 88305 TISSUE EXAM BY PATHOLOGIST: CPT | Mod: 26

## 2020-01-24 LAB — SURGICAL PATHOLOGY STUDY: SIGNIFICANT CHANGE UP

## 2021-03-04 ENCOUNTER — TRANSCRIPTION ENCOUNTER (OUTPATIENT)
Age: 51
End: 2021-03-04

## 2021-10-27 NOTE — PATIENT PROFILE ADULT - NSPROIMPLANTSMEDDEV_GEN_A_NUR
Routing refill request to provider for review/approval because:  Labs out of range:    No results found for: ALT  No results found for: AST   No results found for: WBC  No results found for: RBC  No results found for: HGB  No results found for: HCT  No components found for: MCT  No results found for: MCV  No results found for: MCH  No results found for: MCHC  No results found for: RDW  No results found for: PLT    BP under 140/90 in past 12 months    Patient age 6-64 years    Jenn Dixon, RN        
Heart valve

## 2022-12-22 NOTE — PHYSICAL THERAPY INITIAL EVALUATION ADULT - LEVEL OF INDEPENDENCE: GAIT, REHAB EVAL
Detail Level: Generalized
Detail Level: Zone
Detail Level: Detailed
Patient Specific Counseling (Will Not Stick From Patient To Patient): Size: 1.5 cm
independent

## 2023-12-18 NOTE — ED PROVIDER NOTE - NS_EDPROVIDERDISPOUSERTYPE_ED_A_ED
Provider: LUIZA    Caller: JANAE    Relationship to Patient: WIFE    Phone Number: [phone]     Reason for Call: PT WIFE CALLED AND IS WANTING TO KNOW IF APPT AT 1:30 TODAY 12/18/23 CAN BE CHANGED TO A VIDEO VISIT.    PLEASE REVIEW AND ADVISE.  THANK YOU    
Rescheudled patient. Dr. Le does see video visits. Spoke to wife.   
Attending Attestation (For Attendings USE Only)...
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
